# Patient Record
Sex: FEMALE | Race: WHITE | NOT HISPANIC OR LATINO | Employment: UNEMPLOYED | URBAN - NONMETROPOLITAN AREA
[De-identification: names, ages, dates, MRNs, and addresses within clinical notes are randomized per-mention and may not be internally consistent; named-entity substitution may affect disease eponyms.]

---

## 2019-01-21 ENCOUNTER — OFFICE VISIT (OUTPATIENT)
Dept: PULMONOLOGY | Facility: CLINIC | Age: 54
End: 2019-01-21

## 2019-01-21 ENCOUNTER — LAB (OUTPATIENT)
Dept: LAB | Facility: HOSPITAL | Age: 54
End: 2019-01-21

## 2019-01-21 ENCOUNTER — HOSPITAL ENCOUNTER (OUTPATIENT)
Dept: GENERAL RADIOLOGY | Facility: HOSPITAL | Age: 54
Discharge: HOME | End: 2019-01-21
Admitting: NURSE PRACTITIONER

## 2019-01-21 ENCOUNTER — DOCUMENTATION (OUTPATIENT)
Dept: PULMONOLOGY | Facility: CLINIC | Age: 54
End: 2019-01-21

## 2019-01-21 VITALS
OXYGEN SATURATION: 93 % | WEIGHT: 216.2 LBS | DIASTOLIC BLOOD PRESSURE: 80 MMHG | TEMPERATURE: 98 F | SYSTOLIC BLOOD PRESSURE: 130 MMHG | HEART RATE: 109 BPM | HEIGHT: 66 IN | BODY MASS INDEX: 34.75 KG/M2

## 2019-01-21 DIAGNOSIS — F17.211 CIGARETTE NICOTINE DEPENDENCE IN REMISSION: ICD-10-CM

## 2019-01-21 DIAGNOSIS — R09.02 HYPOXIA: ICD-10-CM

## 2019-01-21 DIAGNOSIS — I50.9 CONGESTIVE HEART FAILURE, UNSPECIFIED HF CHRONICITY, UNSPECIFIED HEART FAILURE TYPE (HCC): ICD-10-CM

## 2019-01-21 DIAGNOSIS — R53.83 OTHER FATIGUE: ICD-10-CM

## 2019-01-21 DIAGNOSIS — G47.33 OSA (OBSTRUCTIVE SLEEP APNEA): ICD-10-CM

## 2019-01-21 DIAGNOSIS — J43.2 CENTRILOBULAR EMPHYSEMA (HCC): ICD-10-CM

## 2019-01-21 DIAGNOSIS — R06.02 SHORTNESS OF BREATH: Primary | ICD-10-CM

## 2019-01-21 DIAGNOSIS — R06.02 SHORTNESS OF BREATH: ICD-10-CM

## 2019-01-21 LAB
ANION GAP SERPL CALCULATED.3IONS-SCNC: 9.6 MMOL/L (ref 3.6–11.2)
BASOPHILS # BLD AUTO: 0.02 10*3/MM3 (ref 0–0.3)
BASOPHILS NFR BLD AUTO: 0.2 % (ref 0–2)
BUN BLD-MCNC: 13 MG/DL (ref 7–21)
BUN/CREAT SERPL: 13.3 (ref 7–25)
CALCIUM SPEC-SCNC: 10 MG/DL (ref 7.7–10)
CHLORIDE SERPL-SCNC: 102 MMOL/L (ref 99–112)
CO2 SERPL-SCNC: 24.4 MMOL/L (ref 24.3–31.9)
CREAT BLD-MCNC: 0.98 MG/DL (ref 0.43–1.29)
DEPRECATED RDW RBC AUTO: 43.6 FL (ref 37–54)
EOSINOPHIL # BLD AUTO: 0.27 10*3/MM3 (ref 0–0.7)
EOSINOPHIL NFR BLD AUTO: 2.4 % (ref 0–5)
ERYTHROCYTE [DISTWIDTH] IN BLOOD BY AUTOMATED COUNT: 14.1 % (ref 11.5–14.5)
GFR SERPL CREATININE-BSD FRML MDRD: 59 ML/MIN/1.73
GLUCOSE BLD-MCNC: 309 MG/DL (ref 70–110)
HCT VFR BLD AUTO: 42.3 % (ref 37–47)
HGB BLD-MCNC: 14 G/DL (ref 12–16)
IMM GRANULOCYTES # BLD AUTO: 0.03 10*3/MM3 (ref 0–0.03)
IMM GRANULOCYTES NFR BLD AUTO: 0.3 % (ref 0–0.5)
LYMPHOCYTES # BLD AUTO: 4.86 10*3/MM3 (ref 1–3)
LYMPHOCYTES NFR BLD AUTO: 43.2 % (ref 21–51)
MCH RBC QN AUTO: 28.5 PG (ref 27–33)
MCHC RBC AUTO-ENTMCNC: 33.1 G/DL (ref 33–37)
MCV RBC AUTO: 86 FL (ref 80–94)
MONOCYTES # BLD AUTO: 0.5 10*3/MM3 (ref 0.1–0.9)
MONOCYTES NFR BLD AUTO: 4.4 % (ref 0–10)
NEUTROPHILS # BLD AUTO: 5.56 10*3/MM3 (ref 1.4–6.5)
NEUTROPHILS NFR BLD AUTO: 49.5 % (ref 30–70)
OSMOLALITY SERPL CALC.SUM OF ELEC: 283.8 MOSM/KG (ref 273–305)
PLATELET # BLD AUTO: 303 10*3/MM3 (ref 130–400)
PMV BLD AUTO: 11.6 FL (ref 6–10)
POTASSIUM BLD-SCNC: 4.2 MMOL/L (ref 3.5–5.3)
RBC # BLD AUTO: 4.92 10*6/MM3 (ref 4.2–5.4)
SODIUM BLD-SCNC: 136 MMOL/L (ref 135–153)
T3FREE SERPL-MCNC: 2.5 PG/ML (ref 2.3–4.2)
T4 FREE SERPL-MCNC: 1.15 NG/DL (ref 0.89–1.76)
TSH SERPL DL<=0.05 MIU/L-ACNC: 1.25 MIU/ML (ref 0.55–4.78)
WBC NRBC COR # BLD: 11.24 10*3/MM3 (ref 4.5–12.5)

## 2019-01-21 PROCEDURE — 80048 BASIC METABOLIC PNL TOTAL CA: CPT

## 2019-01-21 PROCEDURE — 99205 OFFICE O/P NEW HI 60 MIN: CPT | Performed by: NURSE PRACTITIONER

## 2019-01-21 PROCEDURE — 84439 ASSAY OF FREE THYROXINE: CPT

## 2019-01-21 PROCEDURE — 94618 PULMONARY STRESS TESTING: CPT | Performed by: NURSE PRACTITIONER

## 2019-01-21 PROCEDURE — 85025 COMPLETE CBC W/AUTO DIFF WBC: CPT

## 2019-01-21 PROCEDURE — 36415 COLL VENOUS BLD VENIPUNCTURE: CPT

## 2019-01-21 PROCEDURE — 71046 X-RAY EXAM CHEST 2 VIEWS: CPT

## 2019-01-21 PROCEDURE — 84481 FREE ASSAY (FT-3): CPT

## 2019-01-21 PROCEDURE — 71046 X-RAY EXAM CHEST 2 VIEWS: CPT | Performed by: RADIOLOGY

## 2019-01-21 PROCEDURE — 94664 DEMO&/EVAL PT USE INHALER: CPT | Performed by: NURSE PRACTITIONER

## 2019-01-21 PROCEDURE — 84443 ASSAY THYROID STIM HORMONE: CPT

## 2019-01-21 RX ORDER — DEXTROMETHORPHAN HBR, GUAIFENESIN 20; 200 MG/10ML; MG/10ML
SYRUP ORAL
COMMUNITY
Start: 2018-11-13 | End: 2019-03-07

## 2019-01-21 RX ORDER — ASPIRIN 325 MG
81 TABLET ORAL DAILY
COMMUNITY
Start: 2019-01-04

## 2019-01-21 RX ORDER — OFLOXACIN 3 MG/ML
SOLUTION/ DROPS OPHTHALMIC
COMMUNITY
Start: 2016-01-07 | End: 2019-03-07

## 2019-01-21 RX ORDER — OXYBUTYNIN CHLORIDE 5 MG/1
TABLET ORAL
COMMUNITY
Start: 2018-11-13 | End: 2019-03-27

## 2019-01-21 RX ORDER — MONTELUKAST SODIUM 10 MG/1
10 TABLET ORAL NIGHTLY
COMMUNITY
Start: 2019-01-04

## 2019-01-21 RX ORDER — LORATADINE 10 MG/1
TABLET ORAL
COMMUNITY
Start: 2019-01-04 | End: 2019-03-27

## 2019-01-21 RX ORDER — LOVASTATIN 10 MG/1
10 TABLET ORAL EVERY EVENING
COMMUNITY
Start: 2016-01-07 | End: 2019-06-03 | Stop reason: ALTCHOICE

## 2019-01-21 RX ORDER — FLUOXETINE HYDROCHLORIDE 40 MG/1
40 CAPSULE ORAL NIGHTLY
COMMUNITY
Start: 2019-01-04

## 2019-01-21 RX ORDER — BUDESONIDE AND FORMOTEROL FUMARATE DIHYDRATE 160; 4.5 UG/1; UG/1
2 AEROSOL RESPIRATORY (INHALATION) 2 TIMES DAILY
Qty: 1 INHALER | Refills: 11 | Status: SHIPPED | OUTPATIENT
Start: 2019-01-21 | End: 2019-03-27

## 2019-01-21 RX ORDER — LOVASTATIN 10 MG/1
TABLET ORAL
COMMUNITY
Start: 2019-01-04 | End: 2019-03-07 | Stop reason: SDUPTHER

## 2019-01-21 RX ORDER — AZITHROMYCIN 250 MG/1
TABLET, FILM COATED ORAL
COMMUNITY
Start: 2018-11-13 | End: 2019-03-07

## 2019-01-21 RX ORDER — ALBUTEROL SULFATE 90 UG/1
AEROSOL, METERED RESPIRATORY (INHALATION)
COMMUNITY
Start: 2018-11-13 | End: 2019-03-27

## 2019-01-21 RX ORDER — METFORMIN HYDROCHLORIDE 500 MG/1
1000 TABLET, EXTENDED RELEASE ORAL 2 TIMES DAILY
COMMUNITY
Start: 2019-01-04

## 2019-01-21 RX ORDER — BUSPIRONE HYDROCHLORIDE 10 MG/1
10 TABLET ORAL 2 TIMES DAILY
COMMUNITY
Start: 2019-01-04

## 2019-01-21 NOTE — PROGRESS NOTES
Were you born premature?  unsure    Any Childhood infections? no      Breathing problems when you were a child? Asthma    Any childhood allergies?    yes             At what age did you begin smoking? Yes, started smoking about 8 years old    Smoking marijuana? no    Any IV drugs? no    How many packs per day? 2, quit 9 years ago    Lung Function Test? yes  Chest X-Ray? yes    CT Chest? no Allergy Test? no    Family hx of Lung disease or Lung Cancer?yes    If FHx is posivitive for lung cancer, what is the relationship of the family member? father and mother    Any hospitalization in the last year? Yes, breathing problems    How far can you walk without getting short of breath? 50 feet    Any coughing? yes    Any wheezing? yes    Acid Reflux? yes    Do you snore? yes    Daytime Fatigue? yes    Any pets? yes   Any pet allergies? 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 no    What inhalers are you currently using? xopenex as needed, advair but is not using    Have you had the Influenza Vaccine? yes    Would you like to receive this Vaccine today? no    Have you had the Pneumonia Vaccine?  yes   Would you like to receive this Vaccine today? no      Subjective    Vivian Collette presents for the following Sleep Apnea       History of Present Illness   Ms. Collette is here today for evaluation of shortness of breath and sleep apnea.  She states that she feels constantly fatigued during the day and states that she falls asleep all the time.  She states that she experiences fragmented sleep in which she wakes up smothering.  She also reports she has smothering and shortness of breath all the time that is worse with exertion.  She states that she does snore and have morning headache.  She also provides a positive history of congestive heart failure.  She states that she is currently taking an albuterol inhaler as needed and is supposed to be using Advair but does not think that it  helps her much.  She states that as a child she did have asthma.  She is a former smoker quitting about 9 years ago.  She states that when she smoked she smoked about 2 packs per day.  Her influenza and pneumonia vaccines are up-to-date.     Review of Systems   Constitutional: Positive for activity change (worsening fatigue) and fatigue.   HENT: Negative for congestion and rhinorrhea.         Loud snoring   Respiratory: Positive for apnea, cough, shortness of breath and wheezing.    Cardiovascular: Negative for chest pain.   Gastrointestinal: Negative for abdominal pain and nausea.   Endocrine: Negative for polydipsia, polyphagia and polyuria.   Skin: Negative for pallor and rash.        Becomes diaphoretic when walking   Allergic/Immunologic: Negative for environmental allergies, food allergies and immunocompromised state.   Neurological: Positive for dizziness, light-headedness and headaches.        Worse when walking   Psychiatric/Behavioral: Positive for sleep disturbance.       Active Problems:  Problem List Items Addressed This Visit        Respiratory    Shortness of breath - Primary    Relevant Orders    Full Pulmonary Function Test With Bronchodilator    Adult Transthoracic Echo Complete W/ Cont if Necessary Per Protocol    XR Chest 2 View (Completed)    CT Chest Without Contrast    ESTEVAN (obstructive sleep apnea)    Relevant Orders    Ambulatory Referral to Sleep Lab    Centrilobular emphysema (CMS/HCC)    Relevant Medications    VENTOLIN  (90 Base) MCG/ACT inhaler    ROBAFEN DM [facility]/CHEST CONGEST  MG/5ML liquid    loratadine (CLARITIN) 10 MG tablet    montelukast (SINGULAIR) 10 MG tablet    budesonide-formoterol (SYMBICORT) 160-4.5 MCG/ACT inhaler    Other Relevant Orders    Full Pulmonary Function Test With Bronchodilator    CT Chest Without Contrast       Other    Other fatigue    Relevant Orders    TSH (Completed)    T3, Free (Completed)    T4, Free (Completed)    CBC & Differential  (Completed)    Basic Metabolic Panel (Completed)      Other Visit Diagnoses     Cigarette nicotine dependence in remission        Relevant Orders    Full Pulmonary Function Test With Bronchodilator    Hypoxia        Relevant Orders    Overnight Sleep Oximetry Study    Miscellaneous DME    Congestive heart failure, unspecified HF chronicity, unspecified heart failure type (CMS/HCC)        Relevant Orders    Ambulatory Referral to Cardiology          Past Medical History:  Past Medical History:   Diagnosis Date   • Diabetes mellitus (CMS/HCC)    • Hypertension        Family History:  Family History   Problem Relation Age of Onset   • Cancer Mother    • Hypertension Mother    • Asthma Mother    • Diabetes Mother    • Asthma Father    • Cancer Father    • Diabetes Father    • Hypertension Father    • Cancer Maternal Aunt    • Asthma Maternal Aunt    • Diabetes Maternal Aunt    • Hypertension Maternal Aunt    • Asthma Maternal Uncle    • Cancer Maternal Uncle    • Diabetes Maternal Uncle    • Hypertension Maternal Uncle    • Asthma Maternal Grandmother    • Cancer Maternal Grandmother    • Diabetes Maternal Grandmother    • Hypertension Maternal Grandmother    • Asthma Maternal Grandfather    • Cancer Maternal Grandfather    • Diabetes Maternal Grandfather    • Hypertension Maternal Grandfather        Social History:  Social History     Tobacco Use   • Smoking status: Former Smoker   • Smokeless tobacco: Never Used   Substance Use Topics   • Alcohol use: No     Frequency: Never       Current Medications:  Current Outpatient Medications   Medication Sig Dispense Refill   • lovastatin (MEVACOR) 10 MG tablet Take  by mouth.     • ofloxacin (OCUFLOX) 0.3 % ophthalmic solution Apply  to eye(s) as directed by provider.     • azithromycin (ZITHROMAX) 250 MG tablet      • CHARY LOW DOSE 81 MG EC tablet      • budesonide-formoterol (SYMBICORT) 160-4.5 MCG/ACT inhaler Inhale 2 puffs 2 (Two) Times a Day. 1 inhaler 11   • busPIRone  "(BUSPAR) 10 MG tablet      • diclofenac (VOLTAREN) 50 MG EC tablet      • FLUoxetine (PROzac) 40 MG capsule      • loratadine (CLARITIN) 10 MG tablet      • lovastatin (MEVACOR) 10 MG tablet      • metFORMIN ER (GLUCOPHAGE-XR) 500 MG 24 hr tablet      • montelukast (SINGULAIR) 10 MG tablet      • oxybutynin (DITROPAN) 5 MG tablet      • ROBAFEN DM CGH/CHEST CONGEST  MG/5ML liquid      • VENTOLIN  (90 Base) MCG/ACT inhaler        No current facility-administered medications for this visit.        Allergies:  No Known Allergies    Vitals:  /80   Pulse 109   Temp 98 °F (36.7 °C)   Ht 167.6 cm (66\")   Wt 98.1 kg (216 lb 3.2 oz)   SpO2 93%   BMI 34.90 kg/m²     Imaging:    Imaging Results (most recent)     None          Pulmonary Functions Testing Results:    No results found for: FEV1, FVC, [id number], TLC, DLCO    Results for orders placed or performed during the hospital encounter of 02/05/16   Pregnancy, urine   Result Value Ref Range    HCG Urine, QL Negative Negative   POCT glucose fingerstick   Result Value Ref Range    Glucose 137 (H) 70 - 110 mg/dL       Objective   Physical Exam     GENERAL APPEARANCE: Well developed, well nourished, alert and cooperative, and appears to be in no acute distress.    HEAD: normocephalic.    EYES: PERRL, EOMI. Fundi normal, vision is grossly intact.    THROAT: Oral cavity and pharynx normal. No inflammation, swelling, exudate, or lesions.     NECK: Neck supple.     CARDIAC: Normal S1 and S2. No S3, S4 or murmurs. Rhythm is regular. There is no peripheral edema, cyanosis or pallor. Extremities are warm and well perfused. Capillary refill is less than 2 seconds. No carotid bruits.    RESPIRATORY: Bilateral air entry positive.  Diminished breath sounds bilaterally.  No wheezing, crackles or rhonchi noted.    GI: Positive bowel sounds. Soft, nondistended, nontender.     MUSCULOSKELETAL: No significant deformity or joint abnormality. No edema. Peripheral pulses " intact. No varicosities.    NEUROLOGICAL: Strength and sensation symmetric and intact throughout.     PSYCHIATRIC: The mental examination revealed the patient was oriented to person, place, and time.       Assessment/Plan       COPD/asthma:  Social History     Tobacco Use   Smoking Status Former Smoker   Smokeless Tobacco Never Used     Plan:  -Continue albuterol inhaler as needed  - Started her on Symbicort 2 puffs twice daily.  - Ordered an echo.  -Ordered chest x-ray.  -Ordered CT scan to evaluate lung parenchyma  -Ordered PFT to assess lung function.  -Ordered CBC, TSH, free T4, free T3 and BMP  - Ordered pulse oximetry while ambulating to assess supplemental oxygen need during exertion.  Initial oxygen saturation was 93% on room air and her heart rate was 103.  She had to quit the test after 4 minutes due to dizziness and lightheadedness.  At this time her oxygen saturation had dropped to 82% on room air with a heart rate of 62.  She improved after 2 minutes of rest to oxygen saturation of 96% and heart rate of 102.  We'll place an order for her to have supplemental oxygen at 2 L when walking and as needed.  - Flu and pneumonia vaccination status: Up to date  -We'll also place a cardiology consultation as patient has a history of CHF and has not followed up with cardiology in a while.  - Ordered nocturnal oximetry      - Inhaler technique demonstration/discussion:  I have extensively discussed the steps.  In summary, the steps were discussed in the following order.Patient was advised to rinse the mouth after steroid inhalation to prevent fungal mucositis.  · Remove the cap from the inhaler and shake well.    · Breathe out all the way.    · Place the mouthpiece of the inhaler between your teeth and seal your lips tightly around it.    · As you start to blow in slowly, press down on the canister one time.   · Keep breathing in as slowly and deeply as you can.    · It should take about 5 seconds for you to  completely breathe in.    · Hold your breath for 10 seconds(count to 10 slowly) to allow the medication to reach the airways of the lung.    · Repeat the above steps for each puff.    · Wait about 1 minute between the puffs.    · Replaced the cap on the inhaler when finished.    - Supplemental oxygen use counseling  I have thoroughly educated the patient on the importance of getting oxygen as prescribed.  Patient was educated on the guidelines that the use of supplemental oxygen more than 15 hours per day is associated with increase survival, reduce COPD complications, improve quality of life and  increase exercise tolerance.  I have extensively discussed the risk of not using supplemental oxygen with the patient which includes worsening of shortness of breath, becoming hypoxic, dizziness, heart attack, irregular heartbeat, fall resulting into brain hemorrhage and death.I had a detailed discussion with the patient over the safety of using oxygen at home.  Following points were discussed in detail..  Oxygen is a safe, non-explosive gas when handled correctly. However, any material that is already burning will burn much faster and hotter in an oxygen-enriched environment, so the following precautions should be observed when you use home oxygen.  · Keep the oxygen at least 3 metres from any open flame or heat source, such as candles or a gas stove, or from anything that could cause a spark.  · Do not smoke or let anyone else smoke near the oxygen equipment.  · Avoid using anything flammable near the oxygen, including petrol, cleaning fluid, and aerosol cans or sprays such as fresheners or hairspray.  · Do not allow alcoholic solutions, oil or grease to come into contact with oxygen supply devices. This includes petroleum jelly.  · Check that all electrical equipment in the vicinity of the oxygen is properly grounded (earthed).  · Avoid using electrical appliances such as hairdryers and razors while oxygen is in  use.  · Make sure you have smoke alarms in your house.  · Keep the oxygen equipment clean and dust free.  · Always plug your oxygen concentrator into a grounded electrical outlet. Never use an extension cord or power board.  · As the oxygen concentrator becomes hot when in use, locate it in a well-ventilated area, away from curtains or drapes.  · Have your oxygen concentrator inspected and serviced regularly according to the supplier's instructions.  · Store oxygen cylinders in an upright secure position in a well-ventilated area away from any open flame, heat source or direct sunlight. Do not cover with cloth or plastic.  · Handle oxygen equipment with care to avoid damaging cylinders.  · Secure and transport oxygen cylinders correctly. Check with your state or territory department of transport regarding the transport of oxygen in cars as safety standards may vary from state to state.  · Use the correct pressure gauge and regulator.  · When a cylinder is almost empty, close the valve and lizandro the cylinder as empty. Do not store full and empty cylinders together.           Obstructive sleep apnea  -Thyroid function panel ordered  - Polysomnography ordered.  - Educated to avoid alcohol, sedatives and narcotics   - Patient's Body mass index is 34.9 kg/m². BMI is above normal parameters. Recommendations include: exercise counseling and nutrition counseling.  - Based on polysomnography results, positive airway pressure treatment will be initiated.  - Patient was educated on positive airway pressure treatment.  As per CMS guidelines, more than 4 hours on 70% of observed nights is considered adherence. Patient was strongly encouraged to use CPAP as much as possible during sleep as more CPAP use is equal to more benefit. Use of heated humidification in positive airway pressure treatment to improve the adherence to the device.  In case of claustrophobia, we will provide the patient cognitive behavioral therapy and  desensitization. Oral appliances use will be discussed with the patient in case of mild to moderate sleep apnea or if the patient with severe disease fail positive airway pressure treatment.       The patient was extensively educated on the consequences of untreated obstructive sleep apnea namely cardiovascular/metabolic disorder, neurocognitive deficit, daytime sleepiness, motor vehicle accidents, depression, mood disorders and reduced quality of life.  At the end of conversation, the patient voices understanding of the disease process and treatment modality.  Patient also understands the risk of untreated obstructive sleep apnea and benefit benefits of the treatment.    Counseling time was greater than 10 minutes.          ICD-10-CM ICD-9-CM   1. Shortness of breath R06.02 786.05   2. Other fatigue R53.83 780.79   3. ESTEVAN (obstructive sleep apnea) G47.33 327.23   4. Centrilobular emphysema (CMS/[facility]) J43.2 492.8   5. Cigarette nicotine dependence in remission F17.211 V15.82   6. Hypoxia R09.02 799.02   7. Congestive heart failure, unspecified HF chronicity, unspecified heart failure type (CMS/[facility]) I50.9 428.0       Return in about 2 months (around 3/21/2019).

## 2019-01-22 ENCOUNTER — TELEPHONE (OUTPATIENT)
Dept: PULMONOLOGY | Facility: CLINIC | Age: 54
End: 2019-01-22

## 2019-01-22 NOTE — TELEPHONE ENCOUNTER
----- Message from NAEEM Stahl sent at 1/21/2019  4:12 PM EST -----  Will you let her know that her chest xray and lab work looked good.  I will let her know about the rest of her results as I get them.    ----- Message -----  From: Interface, Rad Results [place] [facility]  Sent: 1/21/2019   3:39 PM  To: NAEEM Stahl

## 2019-01-24 ENCOUNTER — DOCUMENTATION (OUTPATIENT)
Dept: PULMONOLOGY | Facility: CLINIC | Age: 54
End: 2019-01-24

## 2019-02-07 ENCOUNTER — HOSPITAL ENCOUNTER (OUTPATIENT)
Dept: RESPIRATORY THERAPY | Facility: HOSPITAL | Age: 54
Discharge: HOME | End: 2019-02-07
Admitting: NURSE PRACTITIONER

## 2019-02-07 ENCOUNTER — HOSPITAL ENCOUNTER (OUTPATIENT)
Dept: CARDIOLOGY | Facility: HOSPITAL | Age: 54
Discharge: HOME | End: 2019-02-07

## 2019-02-07 DIAGNOSIS — R06.02 SHORTNESS OF BREATH: ICD-10-CM

## 2019-02-07 DIAGNOSIS — F17.211 CIGARETTE NICOTINE DEPENDENCE IN REMISSION: ICD-10-CM

## 2019-02-07 DIAGNOSIS — J43.2 CENTRILOBULAR EMPHYSEMA (HCC): ICD-10-CM

## 2019-02-07 PROCEDURE — 94640 AIRWAY INHALATION TREATMENT: CPT

## 2019-02-07 PROCEDURE — 94729 DIFFUSING CAPACITY: CPT

## 2019-02-07 PROCEDURE — 94729 DIFFUSING CAPACITY: CPT | Performed by: INTERNAL MEDICINE

## 2019-02-07 PROCEDURE — 94060 EVALUATION OF WHEEZING: CPT

## 2019-02-07 PROCEDURE — 93306 TTE W/DOPPLER COMPLETE: CPT

## 2019-02-07 PROCEDURE — 94060 EVALUATION OF WHEEZING: CPT | Performed by: INTERNAL MEDICINE

## 2019-02-07 PROCEDURE — 93306 TTE W/DOPPLER COMPLETE: CPT | Performed by: INTERNAL MEDICINE

## 2019-02-07 RX ORDER — ALBUTEROL SULFATE 2.5 MG/3ML
2.5 SOLUTION RESPIRATORY (INHALATION) ONCE
Status: COMPLETED | OUTPATIENT
Start: 2019-02-07 | End: 2019-02-07

## 2019-02-07 RX ADMIN — ALBUTEROL SULFATE 2.5 MG: 2.5 SOLUTION RESPIRATORY (INHALATION) at 08:56

## 2019-02-08 LAB
BH CV ECHO MEAS - % IVS THICK: 9.2 %
BH CV ECHO MEAS - % LVPW THICK: 54.9 %
BH CV ECHO MEAS - ACS: 2 CM
BH CV ECHO MEAS - AO MAX PG: 7.7 MMHG
BH CV ECHO MEAS - AO MEAN PG: 3.3 MMHG
BH CV ECHO MEAS - AO ROOT AREA (BSA CORRECTED): 1.5
BH CV ECHO MEAS - AO ROOT AREA: 7.2 CM^2
BH CV ECHO MEAS - AO ROOT DIAM: 3 CM
BH CV ECHO MEAS - AO V2 MAX: 138.5 CM/SEC
BH CV ECHO MEAS - AO V2 MEAN: 81.3 CM/SEC
BH CV ECHO MEAS - AO V2 VTI: 18.9 CM
BH CV ECHO MEAS - BSA(HAYCOCK): 2.2 M^2
BH CV ECHO MEAS - BSA: 2.1 M^2
BH CV ECHO MEAS - BZI_BMI: 34.9 KILOGRAMS/M^2
BH CV ECHO MEAS - BZI_METRIC_HEIGHT: 167.6 CM
BH CV ECHO MEAS - BZI_METRIC_WEIGHT: 98 KG
BH CV ECHO MEAS - EDV(CUBED): 63.3 ML
BH CV ECHO MEAS - EDV(MOD-SP4): 44 ML
BH CV ECHO MEAS - EDV(TEICH): 69.4 ML
BH CV ECHO MEAS - EF(CUBED): 51.7 %
BH CV ECHO MEAS - EF(MOD-SP4): 68.2 %
BH CV ECHO MEAS - EF(TEICH): 44.2 %
BH CV ECHO MEAS - ESV(CUBED): 30.5 ML
BH CV ECHO MEAS - ESV(MOD-SP4): 14 ML
BH CV ECHO MEAS - ESV(TEICH): 38.7 ML
BH CV ECHO MEAS - FS: 21.6 %
BH CV ECHO MEAS - IVS/LVPW: 1.1
BH CV ECHO MEAS - IVSD: 1 CM
BH CV ECHO MEAS - IVSS: 1.1 CM
BH CV ECHO MEAS - LA DIMENSION: 2.7 CM
BH CV ECHO MEAS - LA/AO: 0.89
BH CV ECHO MEAS - LV DIASTOLIC VOL/BSA (35-75): 21.3 ML/M^2
BH CV ECHO MEAS - LV MASS(C)D: 120.5 GRAMS
BH CV ECHO MEAS - LV MASS(C)DI: 58.3 GRAMS/M^2
BH CV ECHO MEAS - LV MASS(C)S: 126.6 GRAMS
BH CV ECHO MEAS - LV MASS(C)SI: 61.3 GRAMS/M^2
BH CV ECHO MEAS - LV SYSTOLIC VOL/BSA (12-30): 6.8 ML/M^2
BH CV ECHO MEAS - LVIDD: 4 CM
BH CV ECHO MEAS - LVIDS: 3.1 CM
BH CV ECHO MEAS - LVLD AP4: 6.9 CM
BH CV ECHO MEAS - LVLS AP4: 5.8 CM
BH CV ECHO MEAS - LVOT AREA (M): 3.1 CM^2
BH CV ECHO MEAS - LVOT AREA: 3.1 CM^2
BH CV ECHO MEAS - LVOT DIAM: 2 CM
BH CV ECHO MEAS - LVPWD: 0.94 CM
BH CV ECHO MEAS - LVPWS: 1.5 CM
BH CV ECHO MEAS - MV A MAX VEL: 86.4 CM/SEC
BH CV ECHO MEAS - MV E MAX VEL: 60.2 CM/SEC
BH CV ECHO MEAS - MV E/A: 0.7
BH CV ECHO MEAS - PA ACC SLOPE: 3778 CM/SEC^2
BH CV ECHO MEAS - PA ACC TIME: 0.04 SEC
BH CV ECHO MEAS - PA PR(ACCEL): 63.2 MMHG
BH CV ECHO MEAS - SI(AO): 66.1 ML/M^2
BH CV ECHO MEAS - SI(CUBED): 15.9 ML/M^2
BH CV ECHO MEAS - SI(MOD-SP4): 14.5 ML/M^2
BH CV ECHO MEAS - SI(TEICH): 14.9 ML/M^2
BH CV ECHO MEAS - SV(AO): 136.6 ML
BH CV ECHO MEAS - SV(CUBED): 32.8 ML
BH CV ECHO MEAS - SV(MOD-SP4): 30 ML
BH CV ECHO MEAS - SV(TEICH): 30.7 ML
MAXIMAL PREDICTED HEART RATE: 166 BPM
STRESS TARGET HR: 141 BPM

## 2019-02-25 ENCOUNTER — DOCUMENTATION (OUTPATIENT)
Dept: PULMONOLOGY | Facility: CLINIC | Age: 54
End: 2019-02-25

## 2019-02-26 ENCOUNTER — TELEPHONE (OUTPATIENT)
Dept: PULMONOLOGY | Facility: CLINIC | Age: 54
End: 2019-02-26

## 2019-02-26 NOTE — TELEPHONE ENCOUNTER
Patient informed  ----- Message from NAEEM Stahl sent at 2/25/2019  3:05 PM EST -----  Please let her know that her PFT showed no major obstruction but we will continue her current inhalers.  ----- Message -----  From: Lyle Young MD  Sent: 2/18/2019  10:12 PM  To: NAEEM Stahl

## 2019-03-07 ENCOUNTER — LAB (OUTPATIENT)
Dept: LAB | Facility: HOSPITAL | Age: 54
End: 2019-03-07

## 2019-03-07 ENCOUNTER — OFFICE VISIT (OUTPATIENT)
Dept: CARDIOLOGY | Facility: CLINIC | Age: 54
End: 2019-03-07

## 2019-03-07 VITALS
BODY MASS INDEX: 37.39 KG/M2 | HEIGHT: 64 IN | OXYGEN SATURATION: 93 % | RESPIRATION RATE: 16 BRPM | DIASTOLIC BLOOD PRESSURE: 86 MMHG | SYSTOLIC BLOOD PRESSURE: 125 MMHG | WEIGHT: 219 LBS | HEART RATE: 92 BPM

## 2019-03-07 DIAGNOSIS — I10 ESSENTIAL HYPERTENSION: ICD-10-CM

## 2019-03-07 DIAGNOSIS — R07.2 PRECORDIAL PAIN: Primary | ICD-10-CM

## 2019-03-07 DIAGNOSIS — E66.9 CLASS 2 OBESITY WITHOUT SERIOUS COMORBIDITY WITH BODY MASS INDEX (BMI) OF 37.0 TO 37.9 IN ADULT, UNSPECIFIED OBESITY TYPE: ICD-10-CM

## 2019-03-07 DIAGNOSIS — I31.39 PERICARDIAL EFFUSION: ICD-10-CM

## 2019-03-07 DIAGNOSIS — E78.5 DYSLIPIDEMIA: ICD-10-CM

## 2019-03-07 DIAGNOSIS — E11.9 TYPE 2 DIABETES MELLITUS WITHOUT COMPLICATION, WITHOUT LONG-TERM CURRENT USE OF INSULIN (HCC): ICD-10-CM

## 2019-03-07 PROBLEM — E66.812 CLASS 2 OBESITY IN ADULT: Status: ACTIVE | Noted: 2019-03-07

## 2019-03-07 LAB — CRP SERPL-MCNC: 2.15 MG/DL (ref 0–0.99)

## 2019-03-07 PROCEDURE — 93000 ELECTROCARDIOGRAM COMPLETE: CPT | Performed by: INTERNAL MEDICINE

## 2019-03-07 PROCEDURE — 86038 ANTINUCLEAR ANTIBODIES: CPT

## 2019-03-07 PROCEDURE — 36415 COLL VENOUS BLD VENIPUNCTURE: CPT

## 2019-03-07 PROCEDURE — 86140 C-REACTIVE PROTEIN: CPT

## 2019-03-07 PROCEDURE — 99204 OFFICE O/P NEW MOD 45 MIN: CPT | Performed by: INTERNAL MEDICINE

## 2019-03-07 RX ORDER — GLYBURIDE 5 MG/1
5 TABLET ORAL DAILY PRN
COMMUNITY

## 2019-03-07 RX ORDER — FLUTICASONE PROPIONATE 50 MCG
2 SPRAY, SUSPENSION (ML) NASAL DAILY
COMMUNITY
End: 2019-03-27

## 2019-03-07 NOTE — PROGRESS NOTES
Pallavi Jones APRN  Vivian Collette  1965  03/07/2019    Patient Active Problem List   Diagnosis   • Shortness of breath   • Other fatigue   • ESTEVAN (obstructive sleep apnea)   • Centrilobular emphysema (CMS/HCC)   • Precordial pain   • Essential hypertension   • Type 2 diabetes mellitus without complication, without long-term current use of insulin (CMS/HCC)   • Dyslipidemia   • Class 2 obesity in adult   • Pericardial effusion (small circumferential with no tamponade) noted on recent echo Doppler study on 1/29/2019.       Dear Pallavi Jones, NAEEM:    Subjective     Vivian Collette is a 53 y.o. female with the problems as listed above, presents    Chief complaint: Recurrent chest pains and shortness of breath and previous history of congestive heart failure and a small pericardial effusion.    History of Present Illness: Ms. Renee is a pleasant 53-year-old  female with history of reportedly having had congestive heart failure in the remote past, has history of COPD/asthma for which she follows with pulmonary services, has been referred here for her history of congestive heart failure and recurrent chest pains.  On further questioning she gives history of having recurrent substernal and left-sided chest pains that she has been having on and off for the last year or so.  These are of moderate intensity.  These chest pains are felt as sharp to dull pains that seem to occur at random with no relation to exertion and resolve spontaneously.  There is some associated shortness of breath but no sweating.  These are mostly localized.  She has multiple risk factors for coronary artery disease including type 2 diabetes mellitus, hypertension, dyslipidemia, positive family history and postmenopausal status.  She has past history of smoking 2 packs a day for about 32 years but quit 7 years ago.    On recent echo Doppler study on 1/29/2019, she was noted to have small circumferential pericardial  effusion with no echocardiographic evidence of cardiac tamponade.  Her LV wall motion and systolic function was normal with no significant valvular abnormalities noted.    Cardiac risk factors:diabetes mellitus, hypertension, smoking, Positive family Hx. of premature athersclerotivc disease., Obesity and Age and postmenopausal status.    No Known Allergies:      Current Outpatient Medications:   •  CHARY LOW DOSE 81 MG EC tablet, , Disp: , Rfl:   •  budesonide-formoterol (SYMBICORT) 160-4.5 MCG/ACT inhaler, Inhale 2 puffs 2 (Two) Times a Day., Disp: 1 inhaler, Rfl: 11  •  busPIRone (BUSPAR) 10 MG tablet, 10 mg 2 (Two) Times a Day., Disp: , Rfl:   •  diclofenac (VOLTAREN) 50 MG EC tablet, , Disp: , Rfl:   •  FLUoxetine (PROzac) 40 MG capsule, , Disp: , Rfl:   •  fluticasone (FLONASE) 50 MCG/ACT nasal spray, 2 sprays into the nostril(s) as directed by provider Daily., Disp: , Rfl:   •  glyBURIDE (DIAbeta) 5 MG tablet, Take 5 mg by mouth Daily With Breakfast., Disp: , Rfl:   •  loratadine (CLARITIN) 10 MG tablet, , Disp: , Rfl:   •  lovastatin (MEVACOR) 10 MG tablet, Take  by mouth., Disp: , Rfl:   •  metFORMIN ER (GLUCOPHAGE-XR) 500 MG 24 hr tablet, 500 mg 2 (Two) Times a Day., Disp: , Rfl:   •  montelukast (SINGULAIR) 10 MG tablet, , Disp: , Rfl:   •  O2 (OXYGEN), Inhale 2 L/min Every Night., Disp: , Rfl:   •  oxybutynin (DITROPAN) 5 MG tablet, , Disp: , Rfl:   •  VENTOLIN  (90 Base) MCG/ACT inhaler, , Disp: , Rfl:   •  metoprolol tartrate (LOPRESSOR) 25 MG tablet, Take 1 tablet by mouth 2 (Two) Times a Day., Disp: 60 tablet, Rfl: 3    Past Medical History:   Diagnosis Date   • Asthma    • Diabetes mellitus (CMS/HCC)    • Hypertension      Past Surgical History:   Procedure Laterality Date   • EAR RECONSTRUCTION     • LAPAROSCOPIC TUBAL LIGATION     • TONSILLECTOMY       Family History   Problem Relation Age of Onset   • Cancer Mother    • Hypertension Mother    • Asthma Mother    • Diabetes Mother    • Asthma  "Father    • Cancer Father    • Diabetes Father    • Hypertension Father    • Cancer Maternal Aunt    • Asthma Maternal Aunt    • Diabetes Maternal Aunt    • Hypertension Maternal Aunt    • Asthma Maternal Uncle    • Cancer Maternal Uncle    • Diabetes Maternal Uncle    • Hypertension Maternal Uncle    • Asthma Maternal Grandmother    • Cancer Maternal Grandmother    • Diabetes Maternal Grandmother    • Hypertension Maternal Grandmother    • Asthma Maternal Grandfather    • Cancer Maternal Grandfather    • Diabetes Maternal Grandfather    • Hypertension Maternal Grandfather      Social History     Tobacco Use   • Smoking status: Former Smoker     Packs/day: 2.00     Types: Cigarettes     Last attempt to quit:      Years since quittin.1   • Smokeless tobacco: Never Used   Substance Use Topics   • Alcohol use: No     Frequency: Never   • Drug use: No       Review of Systems   Constitution: Positive for malaise/fatigue.   HENT: Positive for congestion, hearing loss and nosebleeds.         Sinus prob   Eyes: Positive for visual disturbance.   Cardiovascular: Positive for chest pain, leg swelling and palpitations.   Respiratory: Positive for cough, shortness of breath and wheezing.    Hematologic/Lymphatic: Bruises/bleeds easily.   Skin: Positive for color change, dry skin, nail changes and poor wound healing.   Musculoskeletal: Positive for back pain, joint pain, joint swelling, muscle cramps, muscle weakness and stiffness.   Gastrointestinal: Positive for diarrhea.   Genitourinary: Positive for frequency.   Neurological: Positive for dizziness, headaches, light-headedness, numbness and paresthesias.   Psychiatric/Behavioral: Positive for depression. The patient has insomnia and is nervous/anxious.        Objective   Blood pressure 125/86, pulse 92, resp. rate 16, height 162.6 cm (64\"), weight 99.3 kg (219 lb), SpO2 93 %.  Body mass index is 37.59 kg/m².        Physical Exam   Constitutional: She is oriented to " person, place, and time. She appears well-developed and well-nourished.   HENT:   Mouth/Throat: Oropharynx is clear and moist.   Eyes: EOM are normal. Pupils are equal, round, and reactive to light.   Neck: Neck supple. No JVD present. No tracheal deviation present. No thyromegaly present.   Cardiovascular: Normal rate, regular rhythm, S1 normal and S2 normal. Exam reveals no gallop, no S3, no S4 and no friction rub.   No murmur heard.  Pulses:       Dorsalis pedis pulses are 1+ on the right side, and 1+ on the left side.        Posterior tibial pulses are 2+ on the right side, and 2+ on the left side.   Pulmonary/Chest: Effort normal and breath sounds normal.   Abdominal: Soft. Bowel sounds are normal. She exhibits no mass. There is no tenderness.   Musculoskeletal: Normal range of motion. She exhibits no edema.   Lymphadenopathy:     She has no cervical adenopathy.   Neurological: She is alert and oriented to person, place, and time.   Skin: Skin is warm and dry. No rash noted.   Psychiatric: She has a normal mood and affect.       Lab Results   Component Value Date     01/21/2019    K 4.2 01/21/2019     01/21/2019    CO2 24.4 01/21/2019    BUN 13 01/21/2019    CREATININE 0.98 01/21/2019    GLUCOSE 309 (H) 01/21/2019    CALCIUM 10.0 01/21/2019     No results found for: CKTOTAL  Lab Results   Component Value Date    WBC 11.24 01/21/2019    HGB 14.0 01/21/2019    HCT 42.3 01/21/2019     01/21/2019     No results found for: INR  No results found for: MG  Lab Results   Component Value Date    TSH 1.250 01/21/2019      No results found for: BNP    During this visit the following were done:  Labs Reviewed [x]    Referring Provider Records Reviewed []          ECG 12 Lead  Date/Time: 3/7/2019 1:23 PM  Performed by: Yadiel Bruno MD  Authorized by: Yadiel Bruno MD   Rhythm: sinus rhythm  ST Segments: ST segments normal  T Waves: T waves normal  Other: no other findings    Clinical impression:  normal ECG            Assessment/Plan :   Diagnosis Plan   1. Precordial pain  Stress Test With Myocardial Perfusion.   2. Pericardial effusion (small circumferential with no tamponade) noted on recent echo Doppler study on 1/29/2019.  Antinuclear Antibodies Direct    C-reactive Protein   3. Essential hypertension     4. Type 2 diabetes mellitus without complication, without long-term current use of insulin.     5. Dyslipidemia     6. Class 2 obesity without serious comorbidity with body mass index (BMI) of 37.0 to 37.9 in adult, unspecified obesity type           Recommendations:  1. Continue aspirin 81 mg daily and add metoprolol 25 mg p.o. twice daily.  2. Evaluate her chest pains further with a Lexiscan sestamibi study.  3. Check LEONOR and CRP to screen for any  autoimmune or infectious etiology for her pericardial effusion.  4. We will monitor her pericardial effusion with repeat echo in a few weeks.    Return in about 4 weeks (around 4/4/2019).    As always, Pallavi   I appreciate very much the opportunity to participate in the cardiovascular care of your patients. Please do not hesitate to call me with any questions with regards to Vivian Collette's evaluation and management.       With Best Regards,        Yadiel Bruno MD, FAC    Dragon disclaimer:  Much of this encounter note is an electronic transcription/translation of spoken language to printed text. The electronic translation of spoken language may permit erroneous, or at times, nonsensical words or phrases to be inadvertently transcribed; Although I have reviewed the note for such errors, some may still exist.

## 2019-03-11 LAB — ANA SER QL: NEGATIVE

## 2019-03-27 ENCOUNTER — APPOINTMENT (OUTPATIENT)
Dept: GENERAL RADIOLOGY | Facility: HOSPITAL | Age: 54
End: 2019-03-27

## 2019-03-27 ENCOUNTER — HOSPITAL ENCOUNTER (OUTPATIENT)
Facility: HOSPITAL | Age: 54
Setting detail: OBSERVATION
Discharge: HOME | End: 2019-03-28
Attending: EMERGENCY MEDICINE | Admitting: INTERNAL MEDICINE

## 2019-03-27 ENCOUNTER — OFFICE VISIT (OUTPATIENT)
Dept: PULMONOLOGY | Facility: CLINIC | Age: 54
End: 2019-03-27

## 2019-03-27 VITALS
HEART RATE: 92 BPM | TEMPERATURE: 98 F | WEIGHT: 215.4 LBS | OXYGEN SATURATION: 91 % | SYSTOLIC BLOOD PRESSURE: 108 MMHG | BODY MASS INDEX: 31.9 KG/M2 | DIASTOLIC BLOOD PRESSURE: 72 MMHG | HEIGHT: 69 IN

## 2019-03-27 DIAGNOSIS — J43.2 CENTRILOBULAR EMPHYSEMA (HCC): ICD-10-CM

## 2019-03-27 DIAGNOSIS — R40.0 DAYTIME SLEEPINESS: ICD-10-CM

## 2019-03-27 DIAGNOSIS — R07.89 CHEST TIGHTNESS: ICD-10-CM

## 2019-03-27 DIAGNOSIS — G47.33 OSA (OBSTRUCTIVE SLEEP APNEA): ICD-10-CM

## 2019-03-27 DIAGNOSIS — R07.9 CHEST PAIN, UNSPECIFIED TYPE: Primary | ICD-10-CM

## 2019-03-27 DIAGNOSIS — R06.02 SHORTNESS OF BREATH: Primary | ICD-10-CM

## 2019-03-27 LAB
ALBUMIN SERPL-MCNC: 4.44 G/DL (ref 3.5–5.2)
ALBUMIN/GLOB SERPL: 1.2 G/DL
ALP SERPL-CCNC: 89 U/L (ref 39–117)
ALT SERPL W P-5'-P-CCNC: 30 U/L (ref 1–33)
ANION GAP SERPL CALCULATED.3IONS-SCNC: 14 MMOL/L
AST SERPL-CCNC: 21 U/L (ref 1–32)
BASOPHILS # BLD AUTO: 0.03 10*3/MM3 (ref 0–0.2)
BASOPHILS NFR BLD AUTO: 0.3 % (ref 0–1.5)
BILIRUB SERPL-MCNC: 0.2 MG/DL (ref 0.2–1.2)
BUN BLD-MCNC: 14 MG/DL (ref 6–20)
BUN/CREAT SERPL: 17.1 (ref 7–25)
CALCIUM SPEC-SCNC: 9.9 MG/DL (ref 8.6–10.5)
CHLORIDE SERPL-SCNC: 91 MMOL/L (ref 98–107)
CO2 SERPL-SCNC: 24 MMOL/L (ref 22–29)
CREAT BLD-MCNC: 0.82 MG/DL (ref 0.57–1)
D DIMER PPP FEU-MCNC: 0.34 MCGFEU/ML (ref 0–0.5)
DEPRECATED RDW RBC AUTO: 43 FL (ref 37–54)
EOSINOPHIL # BLD AUTO: 0.31 10*3/MM3 (ref 0–0.4)
EOSINOPHIL NFR BLD AUTO: 2.8 % (ref 0.3–6.2)
ERYTHROCYTE [DISTWIDTH] IN BLOOD BY AUTOMATED COUNT: 14.1 % (ref 12.3–15.4)
GFR SERPL CREATININE-BSD FRML MDRD: 72 ML/MIN/1.73
GLOBULIN UR ELPH-MCNC: 3.6 GM/DL
GLUCOSE BLD-MCNC: 307 MG/DL (ref 65–99)
GLUCOSE BLDC GLUCOMTR-MCNC: 204 MG/DL (ref 70–130)
HCT VFR BLD AUTO: 42.4 % (ref 34–46.6)
HGB BLD-MCNC: 14.1 G/DL (ref 12–15.9)
HOLD SPECIMEN: NORMAL
HOLD SPECIMEN: NORMAL
IMM GRANULOCYTES # BLD AUTO: 0.04 10*3/MM3 (ref 0–0.05)
IMM GRANULOCYTES NFR BLD AUTO: 0.4 % (ref 0–0.5)
LIPASE SERPL-CCNC: 82 U/L (ref 13–60)
LYMPHOCYTES # BLD AUTO: 4.19 10*3/MM3 (ref 0.7–3.1)
LYMPHOCYTES NFR BLD AUTO: 38.4 % (ref 19.6–45.3)
MCH RBC QN AUTO: 28.5 PG (ref 26.6–33)
MCHC RBC AUTO-ENTMCNC: 33.3 G/DL (ref 31.5–35.7)
MCV RBC AUTO: 85.7 FL (ref 79–97)
MONOCYTES # BLD AUTO: 0.61 10*3/MM3 (ref 0.1–0.9)
MONOCYTES NFR BLD AUTO: 5.6 % (ref 5–12)
NEUTROPHILS # BLD AUTO: 5.72 10*3/MM3 (ref 1.4–7)
NEUTROPHILS NFR BLD AUTO: 52.5 % (ref 42.7–76)
NT-PROBNP SERPL-MCNC: 15 PG/ML (ref 5–900)
PLATELET # BLD AUTO: 320 10*3/MM3 (ref 140–450)
PMV BLD AUTO: 11.3 FL (ref 6–12)
POTASSIUM BLD-SCNC: 4.7 MMOL/L (ref 3.5–5.2)
PROT SERPL-MCNC: 8 G/DL (ref 6–8.5)
RBC # BLD AUTO: 4.95 10*6/MM3 (ref 3.77–5.28)
SODIUM BLD-SCNC: 129 MMOL/L (ref 136–145)
TROPONIN T SERPL-MCNC: <0.01 NG/ML (ref 0–0.03)
WBC NRBC COR # BLD: 10.9 10*3/MM3 (ref 3.4–10.8)
WHOLE BLOOD HOLD SPECIMEN: NORMAL
WHOLE BLOOD HOLD SPECIMEN: NORMAL

## 2019-03-27 PROCEDURE — 85379 FIBRIN DEGRADATION QUANT: CPT | Performed by: EMERGENCY MEDICINE

## 2019-03-27 PROCEDURE — 83880 ASSAY OF NATRIURETIC PEPTIDE: CPT | Performed by: EMERGENCY MEDICINE

## 2019-03-27 PROCEDURE — 80053 COMPREHEN METABOLIC PANEL: CPT | Performed by: EMERGENCY MEDICINE

## 2019-03-27 PROCEDURE — 83690 ASSAY OF LIPASE: CPT | Performed by: EMERGENCY MEDICINE

## 2019-03-27 PROCEDURE — G0378 HOSPITAL OBSERVATION PER HR: HCPCS

## 2019-03-27 PROCEDURE — 71046 X-RAY EXAM CHEST 2 VIEWS: CPT

## 2019-03-27 PROCEDURE — 93010 ELECTROCARDIOGRAM REPORT: CPT | Performed by: INTERNAL MEDICINE

## 2019-03-27 PROCEDURE — 96374 THER/PROPH/DIAG INJ IV PUSH: CPT

## 2019-03-27 PROCEDURE — 99214 OFFICE O/P EST MOD 30 MIN: CPT | Performed by: PHYSICIAN ASSISTANT

## 2019-03-27 PROCEDURE — 96375 TX/PRO/DX INJ NEW DRUG ADDON: CPT

## 2019-03-27 PROCEDURE — 25010000002 ONDANSETRON PER 1 MG: Performed by: EMERGENCY MEDICINE

## 2019-03-27 PROCEDURE — 96361 HYDRATE IV INFUSION ADD-ON: CPT

## 2019-03-27 PROCEDURE — 84484 ASSAY OF TROPONIN QUANT: CPT | Performed by: EMERGENCY MEDICINE

## 2019-03-27 PROCEDURE — 93005 ELECTROCARDIOGRAM TRACING: CPT | Performed by: EMERGENCY MEDICINE

## 2019-03-27 PROCEDURE — 84484 ASSAY OF TROPONIN QUANT: CPT | Performed by: INTERNAL MEDICINE

## 2019-03-27 PROCEDURE — 99285 EMERGENCY DEPT VISIT HI MDM: CPT

## 2019-03-27 PROCEDURE — 85025 COMPLETE CBC W/AUTO DIFF WBC: CPT | Performed by: EMERGENCY MEDICINE

## 2019-03-27 PROCEDURE — 25010000002 MORPHINE PER 10 MG: Performed by: EMERGENCY MEDICINE

## 2019-03-27 PROCEDURE — 94799 UNLISTED PULMONARY SVC/PX: CPT

## 2019-03-27 PROCEDURE — 93005 ELECTROCARDIOGRAM TRACING: CPT | Performed by: INTERNAL MEDICINE

## 2019-03-27 PROCEDURE — 82962 GLUCOSE BLOOD TEST: CPT

## 2019-03-27 PROCEDURE — 71046 X-RAY EXAM CHEST 2 VIEWS: CPT | Performed by: RADIOLOGY

## 2019-03-27 RX ORDER — SODIUM CHLORIDE 0.9 % (FLUSH) 0.9 %
3 SYRINGE (ML) INJECTION EVERY 12 HOURS SCHEDULED
Status: DISCONTINUED | OUTPATIENT
Start: 2019-03-27 | End: 2019-03-28 | Stop reason: HOSPADM

## 2019-03-27 RX ORDER — CETIRIZINE HYDROCHLORIDE 10 MG/1
10 TABLET ORAL DAILY
COMMUNITY
End: 2019-04-22 | Stop reason: ALTCHOICE

## 2019-03-27 RX ORDER — ASPIRIN 325 MG
325 TABLET ORAL ONCE
Status: DISCONTINUED | OUTPATIENT
Start: 2019-03-27 | End: 2019-03-27

## 2019-03-27 RX ORDER — SODIUM CHLORIDE 0.9 % (FLUSH) 0.9 %
3-10 SYRINGE (ML) INJECTION AS NEEDED
Status: DISCONTINUED | OUTPATIENT
Start: 2019-03-27 | End: 2019-03-28 | Stop reason: HOSPADM

## 2019-03-27 RX ORDER — DEXTROSE MONOHYDRATE 25 G/50ML
25 INJECTION, SOLUTION INTRAVENOUS
Status: DISCONTINUED | OUTPATIENT
Start: 2019-03-27 | End: 2019-03-28 | Stop reason: HOSPADM

## 2019-03-27 RX ORDER — ASPIRIN 81 MG/1
81 TABLET ORAL DAILY
Status: DISCONTINUED | OUTPATIENT
Start: 2019-03-28 | End: 2019-03-28 | Stop reason: HOSPADM

## 2019-03-27 RX ORDER — CETIRIZINE HYDROCHLORIDE 10 MG/1
10 TABLET ORAL DAILY
Status: DISCONTINUED | OUTPATIENT
Start: 2019-03-28 | End: 2019-03-28 | Stop reason: HOSPADM

## 2019-03-27 RX ORDER — NITROGLYCERIN 0.4 MG/1
0.4 TABLET SUBLINGUAL
Status: DISCONTINUED | OUTPATIENT
Start: 2019-03-27 | End: 2019-03-28 | Stop reason: HOSPADM

## 2019-03-27 RX ORDER — CALCIUM CARBONATE 200(500)MG
3 TABLET,CHEWABLE ORAL 2 TIMES DAILY PRN
Status: DISCONTINUED | OUTPATIENT
Start: 2019-03-27 | End: 2019-03-28 | Stop reason: HOSPADM

## 2019-03-27 RX ORDER — ASPIRIN 81 MG/1
324 TABLET, CHEWABLE ORAL ONCE
Status: COMPLETED | OUTPATIENT
Start: 2019-03-27 | End: 2019-03-27

## 2019-03-27 RX ORDER — ATORVASTATIN CALCIUM 10 MG/1
10 TABLET, FILM COATED ORAL DAILY
Status: DISCONTINUED | OUTPATIENT
Start: 2019-03-28 | End: 2019-03-28 | Stop reason: HOSPADM

## 2019-03-27 RX ORDER — GLIPIZIDE 5 MG/1
5 TABLET ORAL DAILY PRN
Status: DISCONTINUED | OUTPATIENT
Start: 2019-03-27 | End: 2019-03-28 | Stop reason: HOSPADM

## 2019-03-27 RX ORDER — SODIUM CHLORIDE 9 MG/ML
125 INJECTION, SOLUTION INTRAVENOUS CONTINUOUS
Status: DISCONTINUED | OUTPATIENT
Start: 2019-03-27 | End: 2019-03-28

## 2019-03-27 RX ORDER — BUSPIRONE HYDROCHLORIDE 10 MG/1
10 TABLET ORAL 2 TIMES DAILY
Status: DISCONTINUED | OUTPATIENT
Start: 2019-03-27 | End: 2019-03-28 | Stop reason: HOSPADM

## 2019-03-27 RX ORDER — NICOTINE POLACRILEX 4 MG
15 LOZENGE BUCCAL
Status: DISCONTINUED | OUTPATIENT
Start: 2019-03-27 | End: 2019-03-28 | Stop reason: HOSPADM

## 2019-03-27 RX ORDER — MONTELUKAST SODIUM 10 MG/1
10 TABLET ORAL NIGHTLY
Status: DISCONTINUED | OUTPATIENT
Start: 2019-03-27 | End: 2019-03-28 | Stop reason: HOSPADM

## 2019-03-27 RX ORDER — ONDANSETRON 4 MG/1
4 TABLET, FILM COATED ORAL EVERY 6 HOURS PRN
Status: DISCONTINUED | OUTPATIENT
Start: 2019-03-27 | End: 2019-03-28 | Stop reason: HOSPADM

## 2019-03-27 RX ORDER — NALOXONE HCL 0.4 MG/ML
0.4 VIAL (ML) INJECTION
Status: DISCONTINUED | OUTPATIENT
Start: 2019-03-27 | End: 2019-03-28 | Stop reason: HOSPADM

## 2019-03-27 RX ORDER — ONDANSETRON 2 MG/ML
4 INJECTION INTRAMUSCULAR; INTRAVENOUS ONCE
Status: COMPLETED | OUTPATIENT
Start: 2019-03-27 | End: 2019-03-27

## 2019-03-27 RX ORDER — SODIUM CHLORIDE 0.9 % (FLUSH) 0.9 %
10 SYRINGE (ML) INJECTION AS NEEDED
Status: DISCONTINUED | OUTPATIENT
Start: 2019-03-27 | End: 2019-03-28 | Stop reason: HOSPADM

## 2019-03-27 RX ORDER — ACETAMINOPHEN 325 MG/1
650 TABLET ORAL EVERY 4 HOURS PRN
Status: DISCONTINUED | OUTPATIENT
Start: 2019-03-27 | End: 2019-03-28 | Stop reason: HOSPADM

## 2019-03-27 RX ORDER — MORPHINE SULFATE 2 MG/ML
1 INJECTION, SOLUTION INTRAMUSCULAR; INTRAVENOUS EVERY 4 HOURS PRN
Status: DISCONTINUED | OUTPATIENT
Start: 2019-03-27 | End: 2019-03-28 | Stop reason: HOSPADM

## 2019-03-27 RX ORDER — NAPROXEN 250 MG/1
500 TABLET ORAL 2 TIMES DAILY WITH MEALS
Status: DISCONTINUED | OUTPATIENT
Start: 2019-03-27 | End: 2019-03-28 | Stop reason: HOSPADM

## 2019-03-27 RX ORDER — FLUOXETINE HYDROCHLORIDE 20 MG/1
40 CAPSULE ORAL NIGHTLY
Status: DISCONTINUED | OUTPATIENT
Start: 2019-03-27 | End: 2019-03-28 | Stop reason: HOSPADM

## 2019-03-27 RX ADMIN — ASPIRIN 324 MG: 81 TABLET, CHEWABLE ORAL at 17:44

## 2019-03-27 RX ADMIN — MORPHINE SULFATE 2 MG: 4 INJECTION, SOLUTION INTRAMUSCULAR; INTRAVENOUS at 17:42

## 2019-03-27 RX ADMIN — NITROGLYCERIN 0.5 INCH: 20 OINTMENT TOPICAL at 17:44

## 2019-03-27 RX ADMIN — ONDANSETRON 4 MG: 2 INJECTION INTRAMUSCULAR; INTRAVENOUS at 17:40

## 2019-03-27 RX ADMIN — SODIUM CHLORIDE 125 ML/HR: 9 INJECTION, SOLUTION INTRAVENOUS at 17:40

## 2019-03-28 VITALS
OXYGEN SATURATION: 95 % | DIASTOLIC BLOOD PRESSURE: 70 MMHG | WEIGHT: 214.7 LBS | SYSTOLIC BLOOD PRESSURE: 111 MMHG | TEMPERATURE: 97.9 F | HEART RATE: 89 BPM | BODY MASS INDEX: 34.51 KG/M2 | HEIGHT: 66 IN | RESPIRATION RATE: 18 BRPM

## 2019-03-28 LAB
ALBUMIN SERPL-MCNC: 3.83 G/DL (ref 3.5–5.2)
ALBUMIN/GLOB SERPL: 1.2 G/DL
ALP SERPL-CCNC: 74 U/L (ref 39–117)
ALT SERPL W P-5'-P-CCNC: 28 U/L (ref 1–33)
ANION GAP SERPL CALCULATED.3IONS-SCNC: 15.4 MMOL/L
AST SERPL-CCNC: 22 U/L (ref 1–32)
BASOPHILS # BLD AUTO: 0.02 10*3/MM3 (ref 0–0.2)
BASOPHILS NFR BLD AUTO: 0.2 % (ref 0–1.5)
BILIRUB SERPL-MCNC: 0.2 MG/DL (ref 0.2–1.2)
BUN BLD-MCNC: 12 MG/DL (ref 6–20)
BUN/CREAT SERPL: 15 (ref 7–25)
CALCIUM SPEC-SCNC: 9.1 MG/DL (ref 8.6–10.5)
CHLORIDE SERPL-SCNC: 94 MMOL/L (ref 98–107)
CO2 SERPL-SCNC: 22.6 MMOL/L (ref 22–29)
CREAT BLD-MCNC: 0.8 MG/DL (ref 0.57–1)
DEPRECATED RDW RBC AUTO: 44 FL (ref 37–54)
EOSINOPHIL # BLD AUTO: 0.38 10*3/MM3 (ref 0–0.4)
EOSINOPHIL NFR BLD AUTO: 3.6 % (ref 0.3–6.2)
ERYTHROCYTE [DISTWIDTH] IN BLOOD BY AUTOMATED COUNT: 14.2 % (ref 12.3–15.4)
GFR SERPL CREATININE-BSD FRML MDRD: 74 ML/MIN/1.73
GLOBULIN UR ELPH-MCNC: 3.2 GM/DL
GLUCOSE BLD-MCNC: 319 MG/DL (ref 65–99)
GLUCOSE BLDC GLUCOMTR-MCNC: 246 MG/DL (ref 70–130)
GLUCOSE BLDC GLUCOMTR-MCNC: 247 MG/DL (ref 70–130)
GLUCOSE BLDC GLUCOMTR-MCNC: 261 MG/DL (ref 70–130)
HCT VFR BLD AUTO: 39.6 % (ref 34–46.6)
HGB BLD-MCNC: 13.1 G/DL (ref 12–15.9)
IMM GRANULOCYTES # BLD AUTO: 0.02 10*3/MM3 (ref 0–0.05)
IMM GRANULOCYTES NFR BLD AUTO: 0.2 % (ref 0–0.5)
LYMPHOCYTES # BLD AUTO: 4.69 10*3/MM3 (ref 0.7–3.1)
LYMPHOCYTES NFR BLD AUTO: 44.6 % (ref 19.6–45.3)
MAGNESIUM SERPL-MCNC: 1.7 MG/DL (ref 1.6–2.6)
MCH RBC QN AUTO: 28.7 PG (ref 26.6–33)
MCHC RBC AUTO-ENTMCNC: 33.1 G/DL (ref 31.5–35.7)
MCV RBC AUTO: 86.7 FL (ref 79–97)
MONOCYTES # BLD AUTO: 0.62 10*3/MM3 (ref 0.1–0.9)
MONOCYTES NFR BLD AUTO: 5.9 % (ref 5–12)
NEUTROPHILS # BLD AUTO: 4.79 10*3/MM3 (ref 1.4–7)
NEUTROPHILS NFR BLD AUTO: 45.5 % (ref 42.7–76)
NRBC BLD AUTO-RTO: ABNORMAL /100 WBC (ref 0–0)
NT-PROBNP SERPL-MCNC: 9.5 PG/ML (ref 5–900)
PLATELET # BLD AUTO: 276 10*3/MM3 (ref 140–450)
PMV BLD AUTO: 11.6 FL (ref 6–12)
POTASSIUM BLD-SCNC: 4 MMOL/L (ref 3.5–5.2)
PROT SERPL-MCNC: 7 G/DL (ref 6–8.5)
RBC # BLD AUTO: 4.57 10*6/MM3 (ref 3.77–5.28)
SODIUM BLD-SCNC: 132 MMOL/L (ref 136–145)
TROPONIN T SERPL-MCNC: <0.01 NG/ML (ref 0–0.03)
TROPONIN T SERPL-MCNC: <0.01 NG/ML (ref 0–0.03)
WBC NRBC COR # BLD: 10.52 10*3/MM3 (ref 3.4–10.8)

## 2019-03-28 PROCEDURE — 25010000002 ENOXAPARIN PER 10 MG: Performed by: INTERNAL MEDICINE

## 2019-03-28 PROCEDURE — 63710000001 INSULIN ASPART PER 5 UNITS: Performed by: INTERNAL MEDICINE

## 2019-03-28 PROCEDURE — 83735 ASSAY OF MAGNESIUM: CPT | Performed by: INTERNAL MEDICINE

## 2019-03-28 PROCEDURE — 85027 COMPLETE CBC AUTOMATED: CPT | Performed by: INTERNAL MEDICINE

## 2019-03-28 PROCEDURE — 83880 ASSAY OF NATRIURETIC PEPTIDE: CPT | Performed by: INTERNAL MEDICINE

## 2019-03-28 PROCEDURE — 84484 ASSAY OF TROPONIN QUANT: CPT | Performed by: INTERNAL MEDICINE

## 2019-03-28 PROCEDURE — 96372 THER/PROPH/DIAG INJ SC/IM: CPT

## 2019-03-28 PROCEDURE — 82962 GLUCOSE BLOOD TEST: CPT

## 2019-03-28 PROCEDURE — 80053 COMPREHEN METABOLIC PANEL: CPT | Performed by: INTERNAL MEDICINE

## 2019-03-28 PROCEDURE — 96361 HYDRATE IV INFUSION ADD-ON: CPT

## 2019-03-28 PROCEDURE — G0378 HOSPITAL OBSERVATION PER HR: HCPCS

## 2019-03-28 RX ADMIN — METOPROLOL TARTRATE 25 MG: 25 TABLET, FILM COATED ORAL at 09:06

## 2019-03-28 RX ADMIN — SODIUM CHLORIDE, PRESERVATIVE FREE 3 ML: 5 INJECTION INTRAVENOUS at 00:10

## 2019-03-28 RX ADMIN — ENOXAPARIN SODIUM 40 MG: 40 INJECTION SUBCUTANEOUS at 00:09

## 2019-03-28 RX ADMIN — METFORMIN HYDROCHLORIDE 500 MG: 500 TABLET ORAL at 09:06

## 2019-03-28 RX ADMIN — INSULIN ASPART 6 UNITS: 100 INJECTION, SOLUTION INTRAVENOUS; SUBCUTANEOUS at 17:54

## 2019-03-28 RX ADMIN — BUSPIRONE HYDROCHLORIDE 10 MG: 10 TABLET ORAL at 09:06

## 2019-03-28 RX ADMIN — FLUOXETINE 40 MG: 20 CAPSULE ORAL at 00:08

## 2019-03-28 RX ADMIN — CETIRIZINE HCL 10 MG: 10 TABLET ORAL at 09:06

## 2019-03-28 RX ADMIN — SODIUM CHLORIDE, PRESERVATIVE FREE 3 ML: 5 INJECTION INTRAVENOUS at 09:07

## 2019-03-28 RX ADMIN — INSULIN ASPART 4 UNITS: 100 INJECTION, SOLUTION INTRAVENOUS; SUBCUTANEOUS at 09:18

## 2019-03-28 RX ADMIN — ASPIRIN 81 MG: 81 TABLET ORAL at 09:06

## 2019-03-28 RX ADMIN — BUSPIRONE HYDROCHLORIDE 10 MG: 10 TABLET ORAL at 00:09

## 2019-03-28 RX ADMIN — INSULIN ASPART 4 UNITS: 100 INJECTION, SOLUTION INTRAVENOUS; SUBCUTANEOUS at 00:09

## 2019-03-28 RX ADMIN — ATORVASTATIN CALCIUM 10 MG: 10 TABLET, FILM COATED ORAL at 09:06

## 2019-03-28 RX ADMIN — NAPROXEN 500 MG: 250 TABLET ORAL at 17:53

## 2019-03-28 RX ADMIN — NAPROXEN 500 MG: 250 TABLET ORAL at 09:07

## 2019-03-28 RX ADMIN — INSULIN ASPART 4 UNITS: 100 INJECTION, SOLUTION INTRAVENOUS; SUBCUTANEOUS at 12:27

## 2019-03-28 RX ADMIN — MONTELUKAST SODIUM 10 MG: 10 TABLET, COATED ORAL at 00:09

## 2019-03-28 RX ADMIN — NAPROXEN 500 MG: 250 TABLET ORAL at 00:08

## 2019-04-03 ENCOUNTER — TELEPHONE (OUTPATIENT)
Dept: PULMONOLOGY | Facility: CLINIC | Age: 54
End: 2019-04-03

## 2019-04-03 DIAGNOSIS — J43.2 CENTRILOBULAR EMPHYSEMA (HCC): Primary | ICD-10-CM

## 2019-04-04 NOTE — TELEPHONE ENCOUNTER
Called patient to discuss and reminded of ordering of home sleep study due to high suspicion for sleep apnea.    Also discussed that we will switch current albuterol and Symbicort inhalers to Atrovent short-acting inhaler for 4 times daily use as needed and Arnuity Ellipta for once daily use.  Switching to avoid agitation of chest pain until stress test is completed.   Instructed to rinse mouth following use of our annuity.  Patient conveyed understanding.   Discussed that she could ask the pharmacist to explain again or call the office as needed for questions.    Reminded her that I had also ordered for small portable tanks of supplemental oxygen to use throughout the day continuously.  Also nebulizer machine and Atrovent nebulizer treatments  to use for worsening shortness of breath/wheezing.   She conveyed understanding and appreciated call.

## 2019-04-10 ENCOUNTER — HOSPITAL ENCOUNTER (OUTPATIENT)
Dept: CT IMAGING | Facility: HOSPITAL | Age: 54
Discharge: HOME | End: 2019-04-10
Admitting: NURSE PRACTITIONER

## 2019-04-10 DIAGNOSIS — J43.2 CENTRILOBULAR EMPHYSEMA (HCC): ICD-10-CM

## 2019-04-10 DIAGNOSIS — R06.02 SHORTNESS OF BREATH: ICD-10-CM

## 2019-04-10 PROCEDURE — 71250 CT THORAX DX C-: CPT | Performed by: RADIOLOGY

## 2019-04-10 PROCEDURE — 71250 CT THORAX DX C-: CPT

## 2019-04-11 ENCOUNTER — HOSPITAL ENCOUNTER (OUTPATIENT)
Dept: NUCLEAR MEDICINE | Facility: HOSPITAL | Age: 54
Discharge: HOME | End: 2019-04-11

## 2019-04-11 ENCOUNTER — HOSPITAL ENCOUNTER (OUTPATIENT)
Dept: CARDIOLOGY | Facility: HOSPITAL | Age: 54
Discharge: HOME | End: 2019-04-11

## 2019-04-11 DIAGNOSIS — R07.2 PRECORDIAL PAIN: ICD-10-CM

## 2019-04-11 PROCEDURE — A9500 TC99M SESTAMIBI: HCPCS | Performed by: INTERNAL MEDICINE

## 2019-04-11 PROCEDURE — 78452 HT MUSCLE IMAGE SPECT MULT: CPT

## 2019-04-11 PROCEDURE — 93018 CV STRESS TEST I&R ONLY: CPT | Performed by: INTERNAL MEDICINE

## 2019-04-11 PROCEDURE — 78452 HT MUSCLE IMAGE SPECT MULT: CPT | Performed by: INTERNAL MEDICINE

## 2019-04-11 PROCEDURE — 0 TECHNETIUM SESTAMIBI: Performed by: INTERNAL MEDICINE

## 2019-04-11 PROCEDURE — 93017 CV STRESS TEST TRACING ONLY: CPT

## 2019-04-11 PROCEDURE — 25010000002 REGADENOSON 0.4 MG/5ML SOLUTION: Performed by: INTERNAL MEDICINE

## 2019-04-11 RX ADMIN — TECHNETIUM TC 99M SESTAMIBI 1 DOSE: 1 INJECTION INTRAVENOUS at 09:22

## 2019-04-11 RX ADMIN — REGADENOSON 0.4 MG: 0.08 INJECTION, SOLUTION INTRAVENOUS at 09:22

## 2019-04-11 RX ADMIN — TECHNETIUM TC 99M SESTAMIBI 1 DOSE: 1 INJECTION INTRAVENOUS at 07:37

## 2019-04-14 LAB
BH CV NUCLEAR PRIOR STUDY: 3
BH CV STRESS BP STAGE 1: NORMAL
BH CV STRESS BP STAGE 2: NORMAL
BH CV STRESS COMMENTS STAGE 1: NORMAL
BH CV STRESS COMMENTS STAGE 2: NORMAL
BH CV STRESS DOSE REGADENOSON STAGE 1: 0.4
BH CV STRESS DURATION MIN STAGE 1: 0
BH CV STRESS DURATION MIN STAGE 2: 4
BH CV STRESS DURATION SEC STAGE 1: 10
BH CV STRESS DURATION SEC STAGE 2: 0
BH CV STRESS HR STAGE 1: 96
BH CV STRESS HR STAGE 2: 103
BH CV STRESS PROTOCOL 1: NORMAL
BH CV STRESS RECOVERY BP: NORMAL MMHG
BH CV STRESS RECOVERY HR: 80 BPM
BH CV STRESS STAGE 1: 1
BH CV STRESS STAGE 2: 2
MAXIMAL PREDICTED HEART RATE: 165 BPM
PERCENT MAX PREDICTED HR: 62.42 %
STRESS BASELINE BP: NORMAL MMHG
STRESS BASELINE HR: 69 BPM
STRESS PERCENT HR: 73 %
STRESS POST PEAK BP: NORMAL MMHG
STRESS POST PEAK HR: 103 BPM
STRESS TARGET HR: 140 BPM

## 2019-04-15 ENCOUNTER — DOCUMENTATION (OUTPATIENT)
Dept: PULMONOLOGY | Facility: CLINIC | Age: 54
End: 2019-04-15

## 2019-04-15 NOTE — PROGRESS NOTES
CT scan reviewed.  Per radiology report a 4 mm nodule is noted in her lungs.  A 12-month follow-up is recommended.

## 2019-04-16 ENCOUNTER — TELEPHONE (OUTPATIENT)
Dept: PULMONOLOGY | Facility: CLINIC | Age: 54
End: 2019-04-16

## 2019-04-16 NOTE — TELEPHONE ENCOUNTER
Pallavi Jones, NAEEM [place], Rigoberto Barber MA             We let her know that her CT was reviewed and it shows a tiny pulmonary nodule that is about 4 mm in size.  It is felt to be benign and we  will do a repeat CT scan in 1 year to further evaluate for any changes.

## 2019-04-22 ENCOUNTER — OFFICE VISIT (OUTPATIENT)
Dept: CARDIOLOGY | Facility: CLINIC | Age: 54
End: 2019-04-22

## 2019-04-22 VITALS
BODY MASS INDEX: 31.25 KG/M2 | HEIGHT: 69 IN | WEIGHT: 211 LBS | DIASTOLIC BLOOD PRESSURE: 82 MMHG | HEART RATE: 68 BPM | SYSTOLIC BLOOD PRESSURE: 120 MMHG | OXYGEN SATURATION: 95 %

## 2019-04-22 DIAGNOSIS — I31.39 PERICARDIAL EFFUSION: ICD-10-CM

## 2019-04-22 DIAGNOSIS — R94.39 ABNORMAL STRESS TEST: ICD-10-CM

## 2019-04-22 DIAGNOSIS — R07.2 PRECORDIAL PAIN: Primary | ICD-10-CM

## 2019-04-22 DIAGNOSIS — E78.5 DYSLIPIDEMIA: ICD-10-CM

## 2019-04-22 DIAGNOSIS — I10 ESSENTIAL HYPERTENSION: ICD-10-CM

## 2019-04-22 PROCEDURE — 99214 OFFICE O/P EST MOD 30 MIN: CPT | Performed by: PHYSICIAN ASSISTANT

## 2019-04-22 RX ORDER — FLUTICASONE PROPIONATE 50 MCG
2 SPRAY, SUSPENSION (ML) NASAL DAILY
COMMUNITY

## 2019-04-22 RX ORDER — LORATADINE 10 MG/1
CAPSULE, LIQUID FILLED ORAL
COMMUNITY

## 2019-04-22 RX ORDER — ISOSORBIDE MONONITRATE 30 MG/1
30 TABLET, EXTENDED RELEASE ORAL EVERY MORNING
Qty: 30 TABLET | Refills: 5 | Status: SHIPPED | OUTPATIENT
Start: 2019-04-22 | End: 2019-06-03 | Stop reason: SDUPTHER

## 2019-04-22 RX ORDER — NITROGLYCERIN 0.4 MG/1
0.4 TABLET SUBLINGUAL
Qty: 25 TABLET | Refills: 1 | Status: SHIPPED | OUTPATIENT
Start: 2019-04-22

## 2019-04-22 NOTE — PROGRESS NOTES
"Nelly Clay PA  Vivian Collette  1965  04/22/2019    Patient Active Problem List   Diagnosis   • Shortness of breath   • Other fatigue   • ESTEVAN (obstructive sleep apnea)   • Centrilobular emphysema (CMS/HCC)   • Precordial pain   • Essential hypertension   • Type 2 diabetes mellitus without complication, without long-term current use of insulin (CMS/HCC)   • Dyslipidemia   • Class 2 obesity in adult   • Pericardial effusion (small circumferential with no tamponade) noted on recent echo Doppler study on 1/29/2019.   • Chest pain   • Chest tightness   • Abnormal stress test       Dear Nelly Clay PA:    Chief Complaint   Patient presents with   • Chest Pain     [facility] d/c 3/28/19. Stress and echo completed.   • Med Management     bottles presented.     Subjective Vivian Collette is a 54 y.o. female with a past medical history significant for hypertension, type 2 diabetes mellitus, dyslipidemia, and recent complaints of chest pain.  She was recently admitted to [facility] observation unit 3/27/19 through 3/28/19 due to complaints of chest pain.  She ruled out for acute myocardial infarction and was subsequently set up with an outpatient nuclear stress test on 4/11/2019.  This was abnormal secondary to a small size, mild degree of anterior ischemia.  There was normal LV wall motion and it was felt that this was a low risk study.  Previous transthoracic echocardiogram in February 2019 showed a normal EF at 61-65%, grade 1 diastolic dysfunction, and a small less than 1 cm pericardial effusion with no evidence of tamponade.    She presents back to the office today for a follow-up visit.  She continues to have \"pinching\" type chest pain associated with shortness of breath.  It radiates into her back.  It is not brought on by any particular factors.  In the hospital, she had relief with nitroglycerin, but reports that she was not discharged home on any.  She has been taking aspirin and " metoprolol regularly.  She denies any prior left heart catheterization.      Current Outpatient Medications:   •  CHARY LOW DOSE 81 MG EC tablet, Take 81 mg by mouth Daily., Disp: , Rfl:   •  busPIRone (BUSPAR) 10 MG tablet, Take 10 mg by mouth 2 (Two) Times a Day., Disp: , Rfl:   •  diclofenac (VOLTAREN) 50 MG EC tablet, Take 50 mg by mouth 2 (Two) Times a Day., Disp: , Rfl:   •  FLUoxetine (PROzac) 40 MG capsule, Take 40 mg by mouth Every Night., Disp: , Rfl:   •  fluticasone (FLONASE) 50 MCG/ACT nasal spray, 2 sprays into the nostril(s) as directed by provider Daily., Disp: , Rfl:   •  Fluticasone Furoate 100 MCG/ACT aerosol powder , Inhale 1 puff Daily., Disp: 30 each, Rfl: 8  •  glyBURIDE (DIAbeta) 5 MG tablet, Take 5 mg by mouth Daily As Needed. Prior to [facility] Admission, Patient was on: only takes if glucose over 200, Disp: , Rfl:   •  ipratropium (ATROVENT) 0.02 % nebulizer solution, Take 2.5 mL by nebulization 4 (Four) Times a Day As Needed for Wheezing or Shortness of Air., Disp: 12.5 mL, Rfl: 6  •  Loratadine 10 MG capsule, Take  by mouth., Disp: , Rfl:   •  lovastatin (MEVACOR) 10 MG tablet, Take 10 mg by mouth Every Evening., Disp: , Rfl:   •  metFORMIN ER (GLUCOPHAGE-XR) 500 MG 24 hr tablet, Take 1,000 mg by mouth 2 (Two) Times a Day., Disp: , Rfl:   •  metoprolol tartrate (LOPRESSOR) 25 MG tablet, Take 1 tablet by mouth 2 (Two) Times a Day., Disp: 60 tablet, Rfl: 3  •  montelukast (SINGULAIR) 10 MG tablet, Take 10 mg by mouth Every Night., Disp: , Rfl:   •  ipratropium (ATROVENT HFA) 17 MCG/ACT inhaler, Inhale 2 puffs 4 (Four) Times a Day., Disp: 12.9 g, Rfl: 8    The following portions of the patient's history were reviewed and updated as appropriate: allergies, current medications, past family history, past medical history, past social history, past surgical history and problem list.    Social History     Socioeconomic History   • Marital status:      Spouse name: Not on file   • Number of  "children: Not on file   • Years of education: Not on file   • Highest education level: Not on file   Tobacco Use   • Smoking status: Former Smoker     Packs/day: 2.00     Types: Cigarettes     Last attempt to quit: 2012     Years since quittin.3   • Smokeless tobacco: Never Used   Substance and Sexual Activity   • Alcohol use: No     Frequency: Never   • Drug use: No     Review of Systems   Cardiovascular: Positive for chest pain (\"pinched feeling and pressure/heavy\". Pt not sure if it's her COPD or heart), dyspnea on exertion, leg swelling (feet and ankles, pt reports sometimes her face swells as well.) and palpitations (\"pounding feeling and fast\"). Negative for syncope.   Respiratory: Positive for shortness of breath (pt uses 2L O2).    Neurological: Positive for light-headedness.     Objective   Blood pressure 120/82, pulse 68, height 175.3 cm (69\"), weight 95.7 kg (211 lb), SpO2 95 %.  Body mass index is 31.16 kg/m².    Physical Exam   Constitutional: She is oriented to person, place, and time. She appears well-developed and well-nourished. No distress.   HENT:   Head: Normocephalic and atraumatic.   Eyes: Conjunctivae are normal. Right eye exhibits no discharge. Left eye exhibits no discharge.   Neck: Normal range of motion. Neck supple. Carotid bruit is not present.   Cardiovascular: Normal rate, regular rhythm and normal heart sounds. Exam reveals no gallop and no friction rub.   No murmur heard.  Pulmonary/Chest: Effort normal and breath sounds normal. No respiratory distress. She has no wheezes. She has no rales. She exhibits no tenderness.   Musculoskeletal: Normal range of motion. She exhibits no edema.   Neurological: She is alert and oriented to person, place, and time.   Skin: Skin is warm and dry. No rash noted. She is not diaphoretic. No erythema. No pallor.   Psychiatric: She has a normal mood and affect. Her behavior is normal.   Nursing note and vitals reviewed.      Procedures "         Assessment:        Diagnosis Plan   1. Precordial pain     2. Abnormal stress test     3. Pericardial effusion (small circumferential with no tamponade) noted on recent echo Doppler study on 1/29/2019.     4. Essential hypertension     5. Dyslipidemia          Plan:       1. Repeat limited echocardiogram to reevaluate degree of pericardial effusion.   2. Add isosorbide mononitrate 30mg daily.   3. She was given a RX for nitroglycerin 0.4mg as needed for recurrent chest pain.   4. I have discussed the results of her stress test and the option of further evaluation with left heart catheterization as well as the procedure, risks, benefits and alternatives. I have also discussed the option of optimizing medical therapy and close monitoring. She wishes to try medical therapy for now and would prefer to wait on the cath. I have explained that if she has any recurrent or increasing symptoms, to contact the office or return to the emergency department.  She expressed understanding and agree.  5. Follow up in 1 month or sooner if needed.     No Follow-up on file.    I appreciate the opportunity to participate in this patient's cardiovascular care.    Best Regards,    Migdalia Sorto PA-C

## 2019-05-01 ENCOUNTER — HOSPITAL ENCOUNTER (OUTPATIENT)
Dept: CARDIOLOGY | Facility: HOSPITAL | Age: 54
Discharge: HOME | End: 2019-05-01
Admitting: PHYSICIAN ASSISTANT

## 2019-05-01 DIAGNOSIS — I31.39 PERICARDIAL EFFUSION: ICD-10-CM

## 2019-05-01 PROCEDURE — 93308 TTE F-UP OR LMTD: CPT

## 2019-05-01 PROCEDURE — 93321 DOPPLER ECHO F-UP/LMTD STD: CPT

## 2019-05-01 PROCEDURE — 93308 TTE F-UP OR LMTD: CPT | Performed by: INTERNAL MEDICINE

## 2019-05-03 LAB
BH CV ECHO MEAS - BSA(HAYCOCK): 2.2 M^2
BH CV ECHO MEAS - BSA: 2.1 M^2
BH CV ECHO MEAS - BZI_BMI: 31.2 KILOGRAMS/M^2
BH CV ECHO MEAS - BZI_METRIC_HEIGHT: 175.3 CM
BH CV ECHO MEAS - BZI_METRIC_WEIGHT: 95.7 KG
MAXIMAL PREDICTED HEART RATE: 165 BPM
STRESS TARGET HR: 140 BPM

## 2019-05-17 ENCOUNTER — TELEPHONE (OUTPATIENT)
Dept: PULMONOLOGY | Facility: CLINIC | Age: 54
End: 2019-05-17

## 2019-05-17 DIAGNOSIS — G47.33 OSA (OBSTRUCTIVE SLEEP APNEA): Primary | ICD-10-CM

## 2019-05-17 NOTE — TELEPHONE ENCOUNTER
Called patient to discuss that home sleep study was positive for obstructive sleep apnea with suggested PAP therapy use.   Recommended to use the machine for at least 4 hours every night.  Will start on Autopap with pressure limits of 5-15 cm.

## 2019-06-03 ENCOUNTER — OFFICE VISIT (OUTPATIENT)
Dept: CARDIOLOGY | Facility: CLINIC | Age: 54
End: 2019-06-03

## 2019-06-03 VITALS
OXYGEN SATURATION: 99 % | DIASTOLIC BLOOD PRESSURE: 69 MMHG | HEART RATE: 78 BPM | SYSTOLIC BLOOD PRESSURE: 113 MMHG | WEIGHT: 211 LBS | BODY MASS INDEX: 31.25 KG/M2 | HEIGHT: 69 IN

## 2019-06-03 DIAGNOSIS — R07.2 PRECORDIAL PAIN: ICD-10-CM

## 2019-06-03 DIAGNOSIS — J43.2 CENTRILOBULAR EMPHYSEMA (HCC): ICD-10-CM

## 2019-06-03 DIAGNOSIS — I10 ESSENTIAL HYPERTENSION: Primary | ICD-10-CM

## 2019-06-03 DIAGNOSIS — R94.39 ABNORMAL STRESS TEST: ICD-10-CM

## 2019-06-03 DIAGNOSIS — I31.39 PERICARDIAL EFFUSION: ICD-10-CM

## 2019-06-03 DIAGNOSIS — E11.9 TYPE 2 DIABETES MELLITUS WITHOUT COMPLICATION, WITHOUT LONG-TERM CURRENT USE OF INSULIN (HCC): ICD-10-CM

## 2019-06-03 DIAGNOSIS — E78.5 DYSLIPIDEMIA: ICD-10-CM

## 2019-06-03 PROCEDURE — 99214 OFFICE O/P EST MOD 30 MIN: CPT | Performed by: NURSE PRACTITIONER

## 2019-06-03 RX ORDER — ATORVASTATIN CALCIUM 40 MG/1
40 TABLET, FILM COATED ORAL DAILY
Qty: 30 TABLET | Refills: 5 | Status: SHIPPED | OUTPATIENT
Start: 2019-06-03

## 2019-06-03 RX ORDER — ISOSORBIDE MONONITRATE 60 MG/1
60 TABLET, EXTENDED RELEASE ORAL EVERY MORNING
Qty: 30 TABLET | Refills: 5 | Status: SHIPPED | OUTPATIENT
Start: 2019-06-03

## 2019-06-03 NOTE — PROGRESS NOTES
Nelly Clay PA  Vivian Collette  1965  06/03/2019    Patient Active Problem List   Diagnosis   • Shortness of breath   • Other fatigue   • ESTEVAN (obstructive sleep apnea)   • Centrilobular emphysema (CMS/HCC)   • Precordial pain   • Essential hypertension   • Type 2 diabetes mellitus without complication, without long-term current use of insulin (CMS/HCC)   • Dyslipidemia   • Class 2 obesity in adult   • Pericardial effusion (small circumferential with no tamponade) noted on recent echo Doppler study on 1/29/2019.   • Chest pain   • Chest tightness   • Abnormal stress test       Dear Nelly Clay PA:    Subjective     Chief Complaint   Patient presents with   • Chest Pain     follow up; echo result review.   • Med Management     bottles           History of Present Illness:    Vivian Collette is a 54 y.o. female with a past medical history significant for hypertension, diabetes mellitus type 2, dyslipidemia, and abnormal stress test.  Nuclear stress test on 4/11/2019 revealed a small size, mild degree of anterior wall ischemia.  An echocardiogram revealed normal EF with grade 1 diastolic dysfunction and a small (less than 1 cm) pericardial effusion.  After further discussion, she chose to pursue medical management for her chest pains and abnormal stress test.  An echocardiogram was also ordered to reevaluate pericardial effusion.  Echocardiogram on 5/1/2019 revealed a trivial pericardial effusion which had improved since last study.  She had been placed on Imdur 30 mg daily for her chest pains.  She reports she still occasionally has a pinching pain in her left chest wall which does not seem to be related to exertion.  She cannot identify any aggravating factors.  She reports Imdur has helped significantly and almost resolved those pains.        No Known Allergies:      Current Outpatient Medications:   •  CHARY LOW DOSE 81 MG EC tablet, Take 81 mg by mouth Daily., Disp: , Rfl:   •  busPIRone  (BUSPAR) 10 MG tablet, Take 10 mg by mouth 2 (Two) Times a Day., Disp: , Rfl:   •  diclofenac (VOLTAREN) 50 MG EC tablet, Take 50 mg by mouth 2 (Two) Times a Day., Disp: , Rfl:   •  fluticasone (FLONASE) 50 MCG/ACT nasal spray, 2 sprays into the nostril(s) as directed by provider Daily., Disp: , Rfl:   •  Fluticasone Furoate 100 MCG/ACT aerosol powder , Inhale 1 puff Daily., Disp: 30 each, Rfl: 8  •  glyBURIDE (DIAbeta) 5 MG tablet, Take 5 mg by mouth Daily As Needed. Prior to [facility] Admission, Patient was on: only takes if glucose over 200, Disp: , Rfl:   •  ipratropium (ATROVENT HFA) 17 MCG/ACT inhaler, Inhale 2 puffs 4 (Four) Times a Day., Disp: 12.9 g, Rfl: 8  •  ipratropium (ATROVENT) 0.02 % nebulizer solution, Take 2.5 mL by nebulization 4 (Four) Times a Day As Needed for Wheezing or Shortness of Air., Disp: 12.5 mL, Rfl: 6  •  isosorbide mononitrate (IMDUR) 60 MG 24 hr tablet, Take 1 tablet by mouth Every Morning., Disp: 30 tablet, Rfl: 5  •  Loratadine 10 MG capsule, Take  by mouth., Disp: , Rfl:   •  metFORMIN ER (GLUCOPHAGE-XR) 500 MG 24 hr tablet, Take 1,000 mg by mouth 2 (Two) Times a Day., Disp: , Rfl:   •  metoprolol tartrate (LOPRESSOR) 25 MG tablet, Take 1 tablet by mouth 2 (Two) Times a Day., Disp: 60 tablet, Rfl: 3  •  montelukast (SINGULAIR) 10 MG tablet, Take 10 mg by mouth Every Night., Disp: , Rfl:   •  nitroglycerin (NITROSTAT) 0.4 MG SL tablet, Place 1 tablet under the tongue Every 5 (Five) Minutes As Needed for Chest Pain for up to 3 doses., Disp: 25 tablet, Rfl: 1  •  atorvastatin (LIPITOR) 40 MG tablet, Take 1 tablet by mouth Daily., Disp: 30 tablet, Rfl: 5  •  FLUoxetine (PROzac) 40 MG capsule, Take 40 mg by mouth Every Night., Disp: , Rfl:       The following portions of the patient's history were reviewed and updated as appropriate: allergies, current medications, past family history, past medical history, past social history, past surgical history and problem list.    Social History  "    Tobacco Use   • Smoking status: Former Smoker     Packs/day: 2.00     Types: Cigarettes     Last attempt to quit: 2012     Years since quittin.4   • Smokeless tobacco: Never Used   Substance Use Topics   • Alcohol use: No     Frequency: Never   • Drug use: No       Review of Systems   Constitution: Negative for chills and fever.   HENT: Negative for nosebleeds and sore throat.    Cardiovascular: Positive for chest pain. Negative for claudication, dyspnea on exertion, irregular heartbeat, leg swelling, near-syncope, orthopnea, palpitations and syncope.   Respiratory: Positive for shortness of breath. Negative for cough, hemoptysis and wheezing.    Gastrointestinal: Negative for abdominal pain, hematemesis, hematochezia, melena, nausea and vomiting.   Genitourinary: Negative for dysuria and hematuria.   Neurological: Negative for dizziness and headaches.       Objective   Vitals:    19 1338   BP: 113/69   BP Location: Right arm   Patient Position: Sitting   Cuff Size: Adult   Pulse: 78   SpO2: 99%   Weight: 95.7 kg (211 lb)   Height: 175.3 cm (69\")     Body mass index is 31.16 kg/m².        Physical Exam   Constitutional: She is oriented to person, place, and time. She appears well-developed and well-nourished.   HENT:   Head: Normocephalic and atraumatic.   Cardiovascular: Normal rate, regular rhythm and normal heart sounds. Exam reveals no S3 and no S4.   No murmur heard.  Pulmonary/Chest: Effort normal and breath sounds normal. She has no wheezes. She has no rales.   Wearing portable oxygen   Abdominal: Soft. Bowel sounds are normal.   Musculoskeletal: She exhibits no edema.   Neurological: She is alert and oriented to person, place, and time.   Skin: Skin is warm and dry.   Psychiatric: She has a normal mood and affect. Her behavior is normal.       Lab Results   Component Value Date     (L) 2019    K 4.0 2019    CL 94 (L) 2019    CO2 22.6 2019    BUN 12 2019    " CREATININE 0.80 03/28/2019    GLUCOSE 319 (H) 03/28/2019    CALCIUM 9.1 03/28/2019    AST 22 03/28/2019    ALT 28 03/28/2019    ALKPHOS 74 03/28/2019      Lab Results   Component Value Date    WBC 10.52 03/28/2019    HGB 13.1 03/28/2019    HCT 39.6 03/28/2019     03/28/2019      Lab Results   Component Value Date    MG 1.7 03/28/2019     Lab Results   Component Value Date    TSH 1.250 01/21/2019             Procedures      Assessment/Plan    Diagnosis Plan   1. Essential hypertension     2. Pericardial effusion (small circumferential with no tamponade) noted on recent echo Doppler study on 1/29/2019.     3. Abnormal stress test  isosorbide mononitrate (IMDUR) 60 MG 24 hr tablet    atorvastatin (LIPITOR) 40 MG tablet    Lipid Panel   4. Centrilobular emphysema (CMS/HCC)     5. Type 2 diabetes mellitus without complication, without long-term current use of insulin (CMS/HCC)     6. Precordial pain  isosorbide mononitrate (IMDUR) 60 MG 24 hr tablet   7. Dyslipidemia  atorvastatin (LIPITOR) 40 MG tablet    Comprehensive Metabolic Panel    Lipid Panel                Recommendations:    1.  Since the Imdur has helped with her chest pains, will increase to Imdur 60 mg daily.    2.  Discontinue lovastatin and start atorvastatin 40 mg daily    3.  CMP and lipid panel ordered.    4.  We have again discussed further evaluation with cardiac catheterization.  However, since her chest pains are improving she wishes to continue medical management for now she does not want to pursue a cardiac catheterization.    5.  Follow-up in 4 weeks with Dr. Bruno and if needed.        Return in about 4 weeks (around 7/1/2019) for Recheck.    As always, I appreciate very much the opportunity to participate in the cardiovascular care of your patients.      With Best Regards,    NAEEM Kern

## 2019-06-04 ENCOUNTER — LAB (OUTPATIENT)
Dept: LAB | Facility: HOSPITAL | Age: 54
End: 2019-06-04

## 2019-06-04 DIAGNOSIS — R94.39 ABNORMAL STRESS TEST: ICD-10-CM

## 2019-06-04 DIAGNOSIS — E78.5 DYSLIPIDEMIA: ICD-10-CM

## 2019-06-04 LAB
ALBUMIN SERPL-MCNC: 4.3 G/DL (ref 3.5–5.2)
ALBUMIN/GLOB SERPL: 1.4 G/DL
ALP SERPL-CCNC: 73 U/L (ref 39–117)
ALT SERPL W P-5'-P-CCNC: 27 U/L (ref 1–33)
ANION GAP SERPL CALCULATED.3IONS-SCNC: 14.6 MMOL/L
AST SERPL-CCNC: 19 U/L (ref 1–32)
BILIRUB SERPL-MCNC: 0.3 MG/DL (ref 0.2–1.2)
BUN BLD-MCNC: 13 MG/DL (ref 6–20)
BUN/CREAT SERPL: 13.8 (ref 7–25)
CALCIUM SPEC-SCNC: 9.7 MG/DL (ref 8.6–10.5)
CHLORIDE SERPL-SCNC: 101 MMOL/L (ref 98–107)
CHOLEST SERPL-MCNC: 209 MG/DL (ref 0–200)
CO2 SERPL-SCNC: 23.4 MMOL/L (ref 22–29)
CREAT BLD-MCNC: 0.94 MG/DL (ref 0.57–1)
GFR SERPL CREATININE-BSD FRML MDRD: 62 ML/MIN/1.73
GLOBULIN UR ELPH-MCNC: 3.1 GM/DL
GLUCOSE BLD-MCNC: 162 MG/DL (ref 65–99)
HDLC SERPL-MCNC: 32 MG/DL (ref 40–60)
LDLC SERPL CALC-MCNC: 107 MG/DL (ref 0–100)
LDLC/HDLC SERPL: 3.35 {RATIO}
POTASSIUM BLD-SCNC: 4.4 MMOL/L (ref 3.5–5.2)
PROT SERPL-MCNC: 7.4 G/DL (ref 6–8.5)
SODIUM BLD-SCNC: 139 MMOL/L (ref 136–145)
TRIGL SERPL-MCNC: 349 MG/DL (ref 0–150)
VLDLC SERPL-MCNC: 69.8 MG/DL (ref 5–40)

## 2019-06-04 PROCEDURE — 80061 LIPID PANEL: CPT

## 2019-06-04 PROCEDURE — 80053 COMPREHEN METABOLIC PANEL: CPT

## 2019-06-04 PROCEDURE — 36415 COLL VENOUS BLD VENIPUNCTURE: CPT

## 2019-06-05 ENCOUNTER — TELEPHONE (OUTPATIENT)
Dept: CARDIOLOGY | Facility: CLINIC | Age: 54
End: 2019-06-05

## 2019-06-05 NOTE — TELEPHONE ENCOUNTER
----- Message from NAEEM Kenr sent at 6/5/2019  9:01 AM EDT -----  CMp is normal with the exception of glucose which is 162. Make sure to follow with PCP for Diabetes management. Her cholesterol is above goal. Continue atorvastatin.

## 2019-07-02 ENCOUNTER — OFFICE VISIT (OUTPATIENT)
Dept: CARDIOLOGY | Facility: CLINIC | Age: 54
End: 2019-07-02

## 2019-07-02 VITALS
BODY MASS INDEX: 31.1 KG/M2 | SYSTOLIC BLOOD PRESSURE: 106 MMHG | WEIGHT: 210 LBS | DIASTOLIC BLOOD PRESSURE: 73 MMHG | HEART RATE: 74 BPM | HEIGHT: 69 IN | OXYGEN SATURATION: 98 %

## 2019-07-02 DIAGNOSIS — R07.2 PRECORDIAL PAIN: ICD-10-CM

## 2019-07-02 DIAGNOSIS — E78.5 DYSLIPIDEMIA: ICD-10-CM

## 2019-07-02 DIAGNOSIS — I10 ESSENTIAL HYPERTENSION: Primary | ICD-10-CM

## 2019-07-02 DIAGNOSIS — E11.9 TYPE 2 DIABETES MELLITUS WITHOUT COMPLICATION, WITHOUT LONG-TERM CURRENT USE OF INSULIN (HCC): ICD-10-CM

## 2019-07-02 DIAGNOSIS — R94.39 ABNORMAL NUCLEAR STRESS TEST: ICD-10-CM

## 2019-07-02 DIAGNOSIS — J43.2 CENTRILOBULAR EMPHYSEMA (HCC): ICD-10-CM

## 2019-07-02 PROCEDURE — 99214 OFFICE O/P EST MOD 30 MIN: CPT | Performed by: NURSE PRACTITIONER

## 2019-07-02 RX ORDER — RANOLAZINE 500 MG/1
500 TABLET, EXTENDED RELEASE ORAL 2 TIMES DAILY
Qty: 60 TABLET | Refills: 5 | Status: SHIPPED | OUTPATIENT
Start: 2019-07-02

## 2019-07-02 NOTE — PROGRESS NOTES
Nelly Clay PA  Vivian Collette  1965  07/02/2019    Patient Active Problem List   Diagnosis   • Shortness of breath   • Other fatigue   • ESTEVAN (obstructive sleep apnea)   • Centrilobular emphysema (CMS/HCC)   • Precordial pain   • Essential hypertension   • Type 2 diabetes mellitus without complication, without long-term current use of insulin (CMS/HCC)   • Dyslipidemia   • Class 2 obesity in adult   • Pericardial effusion (small circumferential with no tamponade) noted on recent echo Doppler study on 1/29/2019.   • Chest pain   • Chest tightness   • Abnormal nuclear stress test       Dear Nelly Clay PA:    Subjective     Chief Complaint   Patient presents with   • precordial pain     follow up   • Hypertension           History of Present Illness:    Vivian Collette is a 54 y.o. female with a history of hypertension, diabetes mellitus type 2, and dyslipidemia.  Previously, she was complaining of chest pains and underwent further evaluation with nuclear stress test.  This revealed a small size, mild degree of anterior wall ischemia in April 2019.  She was placed on low-dose aspirin, statin therapy, and Imdur daily for her chest pains.  At follow-up in June 2019, her chest pains had improved but she continued to have a pinching pain and under the left breast.  Imdur was increased to 60 mg daily.  She presents today for routine follow-up.  Reports overall, she is doing much better.  Her chest pains have significantly improved.  However, she does have frequent, intermittent sharp pains under the left breast.  She cannot identify any aggravating factors.  These usually last a few seconds and resolve spontaneously.  She does have chronic dyspnea with mild to moderate exertion and wears oxygen 24 hours a day due to severe emphysema.  She is a previous smoker but quit smoking about 12 years ago.  She is a diabetic.  She also has a history of dyslipidemia and hypertension.  Both of her parents have a  history of coronary artery disease.          No Known Allergies:      Current Outpatient Medications:   •  atorvastatin (LIPITOR) 40 MG tablet, Take 1 tablet by mouth Daily., Disp: 30 tablet, Rfl: 5  •  CHARY LOW DOSE 81 MG EC tablet, Take 81 mg by mouth Daily., Disp: , Rfl:   •  busPIRone (BUSPAR) 10 MG tablet, Take 10 mg by mouth 2 (Two) Times a Day., Disp: , Rfl:   •  diclofenac (VOLTAREN) 50 MG EC tablet, Take 50 mg by mouth 2 (Two) Times a Day., Disp: , Rfl:   •  FLUoxetine (PROzac) 40 MG capsule, Take 40 mg by mouth Every Night., Disp: , Rfl:   •  fluticasone (FLONASE) 50 MCG/ACT nasal spray, 2 sprays into the nostril(s) as directed by provider Daily., Disp: , Rfl:   •  Fluticasone Furoate 100 MCG/ACT aerosol powder , Inhale 1 puff Daily., Disp: 30 each, Rfl: 8  •  glyBURIDE (DIAbeta) 5 MG tablet, Take 5 mg by mouth Daily As Needed. Prior to [facility] Admission, Patient was on: only takes if glucose over 200, Disp: , Rfl:   •  ipratropium (ATROVENT HFA) 17 MCG/ACT inhaler, Inhale 2 puffs 4 (Four) Times a Day., Disp: 12.9 g, Rfl: 8  •  ipratropium (ATROVENT) 0.02 % nebulizer solution, Take 2.5 mL by nebulization 4 (Four) Times a Day As Needed for Wheezing or Shortness of Air., Disp: 12.5 mL, Rfl: 6  •  isosorbide mononitrate (IMDUR) 60 MG 24 hr tablet, Take 1 tablet by mouth Every Morning., Disp: 30 tablet, Rfl: 5  •  Loratadine 10 MG capsule, Take  by mouth., Disp: , Rfl:   •  metFORMIN ER (GLUCOPHAGE-XR) 500 MG 24 hr tablet, Take 1,000 mg by mouth 2 (Two) Times a Day., Disp: , Rfl:   •  metoprolol tartrate (LOPRESSOR) 25 MG tablet, Take 1 tablet by mouth 2 (Two) Times a Day., Disp: 60 tablet, Rfl: 3  •  montelukast (SINGULAIR) 10 MG tablet, Take 10 mg by mouth Every Night., Disp: , Rfl:   •  nitroglycerin (NITROSTAT) 0.4 MG SL tablet, Place 1 tablet under the tongue Every 5 (Five) Minutes As Needed for Chest Pain for up to 3 doses., Disp: 25 tablet, Rfl: 1  •  ranolazine (RANEXA) 500 MG 12 hr tablet, Take 1  "tablet by mouth 2 (Two) Times a Day., Disp: 60 tablet, Rfl: 5      The following portions of the patient's history were reviewed and updated as appropriate: allergies, current medications, past family history, past medical history, past social history, past surgical history and problem list.    Social History     Tobacco Use   • Smoking status: Former Smoker     Packs/day: 2.00     Types: Cigarettes     Last attempt to quit: 2012     Years since quittin.5   • Smokeless tobacco: Never Used   Substance Use Topics   • Alcohol use: No     Frequency: Never   • Drug use: No       Review of Systems   Constitution: Negative for weakness and malaise/fatigue.   Cardiovascular: Positive for chest pain, leg swelling, near-syncope and palpitations. Negative for dyspnea on exertion, irregular heartbeat, orthopnea, paroxysmal nocturnal dyspnea and syncope.   Respiratory: Positive for shortness of breath. Negative for cough and wheezing.    Neurological: Positive for dizziness. Negative for light-headedness.       Objective   Vitals:    19 1523   BP: 106/73   BP Location: Right arm   Pulse: 74   SpO2: 98%   Weight: 95.3 kg (210 lb)   Height: 175.3 cm (69.02\")     Body mass index is 31 kg/m².        Physical Exam   Constitutional: She is oriented to person, place, and time. She appears well-developed and well-nourished.   HENT:   Head: Normocephalic and atraumatic.   Cardiovascular: Normal rate, regular rhythm and normal heart sounds. Exam reveals no S3 and no S4.   No murmur heard.  Pulmonary/Chest: Effort normal. She has no wheezes. She has no rales.   Diminished breath sounds throughout  Wearing portable oxygen via nasal cannula   Abdominal: Soft. Bowel sounds are normal.   Musculoskeletal: She exhibits no edema.   Neurological: She is alert and oriented to person, place, and time.   Skin: Skin is warm and dry.   Psychiatric: She has a normal mood and affect. Her behavior is normal.       Lab Results   Component Value " Date     06/04/2019    K 4.4 06/04/2019     06/04/2019    CO2 23.4 06/04/2019    BUN 13 06/04/2019    CREATININE 0.94 06/04/2019    GLUCOSE 162 (H) 06/04/2019    CALCIUM 9.7 06/04/2019    AST 19 06/04/2019    ALT 27 06/04/2019    ALKPHOS 73 06/04/2019       Lab Results   Component Value Date    WBC 10.52 03/28/2019    HGB 13.1 03/28/2019    HCT 39.6 03/28/2019     03/28/2019       Lab Results   Component Value Date    MG 1.7 03/28/2019     Lab Results   Component Value Date    TSH 1.250 01/21/2019    TRIG 349 (H) 06/04/2019    HDL 32 (L) 06/04/2019     (H) 06/04/2019          Procedures      Assessment/Plan    Diagnosis Plan   1. Essential hypertension  CT Angiogram Coronary   2. Centrilobular emphysema (CMS/HCC)     3. Type 2 diabetes mellitus without complication, without long-term current use of insulin (CMS/HCC)  CT Angiogram Coronary   4. Dyslipidemia  CT Angiogram Coronary   5. Precordial pain  ranolazine (RANEXA) 500 MG 12 hr tablet    CT Angiogram Coronary   6. Abnormal nuclear stress test  CT Angiogram Coronary                Recommendations:    1. Since she has multiple risk factors for CAD with persistent chest pains, and mildly abnormal nuclear stress test, I have ordered a CT coronary angiogram to further evaluate her symptoms.    2. Will add Ranexa 500 mg BID for her chest pains.    3. Continue low dose aspirin, metoprolol, atorvastatin, and Imdur.    4. Follow up in 4 weeks and PRN.         Return in about 4 weeks (around 7/30/2019) for Recheck.    As always, I appreciate very much the opportunity to participate in the cardiovascular care of your patients.      With Best Regards,    NAEEM Kern

## 2019-11-11 NOTE — TELEPHONE ENCOUNTER
Called pt and advised her that she will need to be seen and keep the apt for further refills she expressed understanding.

## 2019-12-01 DIAGNOSIS — R94.39 ABNORMAL STRESS TEST: ICD-10-CM

## 2019-12-01 DIAGNOSIS — E78.5 DYSLIPIDEMIA: ICD-10-CM

## 2019-12-02 RX ORDER — ATORVASTATIN CALCIUM 40 MG/1
TABLET, FILM COATED ORAL
Qty: 30 TABLET | Refills: 5 | OUTPATIENT
Start: 2019-12-02

## 2019-12-16 DIAGNOSIS — E78.5 DYSLIPIDEMIA: ICD-10-CM

## 2019-12-16 DIAGNOSIS — R94.39 ABNORMAL STRESS TEST: ICD-10-CM

## 2019-12-16 RX ORDER — ATORVASTATIN CALCIUM 40 MG/1
TABLET, FILM COATED ORAL
Qty: 30 TABLET | Refills: 0 | OUTPATIENT
Start: 2019-12-16

## 2019-12-18 DIAGNOSIS — R94.39 ABNORMAL STRESS TEST: ICD-10-CM

## 2019-12-18 DIAGNOSIS — E78.5 DYSLIPIDEMIA: ICD-10-CM

## 2019-12-18 RX ORDER — ATORVASTATIN CALCIUM 40 MG/1
TABLET, FILM COATED ORAL
Qty: 30 TABLET | Refills: 0 | OUTPATIENT
Start: 2019-12-18

## 2019-12-30 RX ORDER — FLUTICASONE FUROATE 100 UG/1
POWDER RESPIRATORY (INHALATION)
Qty: 30 EACH | Refills: 0 | Status: SHIPPED | OUTPATIENT
Start: 2019-12-30 | End: 2020-01-31

## 2020-01-31 RX ORDER — FLUTICASONE FUROATE 100 UG/1
POWDER RESPIRATORY (INHALATION)
Qty: 30 EACH | Refills: 0 | Status: SHIPPED | OUTPATIENT
Start: 2020-01-31 | End: 2020-03-30

## 2020-03-30 RX ORDER — FLUTICASONE FUROATE 100 UG/1
POWDER RESPIRATORY (INHALATION)
Qty: 30 EACH | Refills: 0 | Status: SHIPPED | OUTPATIENT
Start: 2020-03-30 | End: 2020-04-22 | Stop reason: SDUPTHER

## 2020-04-22 ENCOUNTER — OFFICE VISIT (OUTPATIENT)
Dept: PULMONOLOGY | Facility: CLINIC | Age: 55
End: 2020-04-22

## 2020-04-22 DIAGNOSIS — G47.33 OSA (OBSTRUCTIVE SLEEP APNEA): Primary | ICD-10-CM

## 2020-04-22 DIAGNOSIS — J96.11 CHRONIC RESPIRATORY FAILURE WITH HYPOXIA (HCC): ICD-10-CM

## 2020-04-22 DIAGNOSIS — R91.1 PULMONARY NODULE: ICD-10-CM

## 2020-04-22 DIAGNOSIS — J43.2 CENTRILOBULAR EMPHYSEMA (HCC): ICD-10-CM

## 2020-04-22 DIAGNOSIS — Z87.891 FORMER SMOKER: ICD-10-CM

## 2020-04-22 PROCEDURE — 99442 PR PHYS/QHP TELEPHONE EVALUATION 11-20 MIN: CPT | Performed by: PHYSICIAN ASSISTANT

## 2020-04-22 RX ORDER — ALBUTEROL SULFATE 2.5 MG/3ML
2.5 SOLUTION RESPIRATORY (INHALATION) EVERY 4 HOURS PRN
Qty: 360 ML | Refills: 8 | Status: SHIPPED | OUTPATIENT
Start: 2020-04-22

## 2020-04-22 RX ORDER — ALBUTEROL SULFATE 90 UG/1
2 AEROSOL, METERED RESPIRATORY (INHALATION) EVERY 4 HOURS PRN
Qty: 1 INHALER | Refills: 8 | Status: SHIPPED | OUTPATIENT
Start: 2020-04-22

## 2020-04-22 NOTE — PROGRESS NOTES
You have chosen to receive care through a telephone visit. Do you consent to use a telephone visit for your medical care today? Yes      Interval history since last visit:    Recent hospitalizations:    Investigations (imaging, PFT's, labs, sleep study, record requests, etc.)    Have you had the Influenza Vaccine? yes      Have you had the Pneumonia Vaccine?  no      Subjective    Vivian Collette presents for the following Shortness of Breath; Apnea; Snoring; and COPD      History of Present Illness     Encounter was completed today via telephone due to COVID-19.  Follow-up is completed for centrilobular emphysema, chronic hypoxic respiratory failure, obstructive sleep apnea, pulmonary nodule, former smoking history.  Since being started on AutoPap machine, she reports significant improvement in fatigue.  She attempts to maintain nightly use of greater than 4 hours/min and reports that she has been able to use this during  the duration of sleep.  She otherwise continues with use of 2 L/min via nasal cannula throughout the day.  She reports that shortness of breath is overall stable at this time.  Inhaler therapy was switched at the last visit due to reports of tachycardia, chest pain.  She was previously on Symbicort but this was discontinued to tachycardia agitation from the beta agonist.  She continues to follow-up with cardiology and symptoms are improved at this time.  She reports that shortness of breath is noted with exertion but stable at this time.  She denies any recent acute illness, recent fever, recent sick contacts.  No significant phlegm production.  She is a former smoker with 20-year use of approximately 2 packs/day.  She would like to proceed with low-dose CT scan lung cancer screening.    Review of Systems   Constitutional: Negative for chills, fatigue and fever.   HENT: Negative for congestion, postnasal drip and rhinorrhea.    Respiratory: Positive for shortness of breath (Exertional, at  baseline). Negative for cough and wheezing.        Active Problems:  Problem List Items Addressed This Visit     None          Past Medical History:  Past Medical History:   Diagnosis Date   • Asthma    • COPD (chronic obstructive pulmonary disease) (CMS/HCC)    • Diabetes mellitus (CMS/HCC)    • Hypertension    • Sleep apnea        Family History:  Family History   Problem Relation Age of Onset   • Cancer Mother    • Hypertension Mother    • Asthma Mother    • Diabetes Mother    • Asthma Father    • Cancer Father    • Diabetes Father    • Hypertension Father    • Cancer Maternal Aunt    • Asthma Maternal Aunt    • Diabetes Maternal Aunt    • Hypertension Maternal Aunt    • Asthma Maternal Uncle    • Cancer Maternal Uncle    • Diabetes Maternal Uncle    • Hypertension Maternal Uncle    • Asthma Maternal Grandmother    • Cancer Maternal Grandmother    • Diabetes Maternal Grandmother    • Hypertension Maternal Grandmother    • Asthma Maternal Grandfather    • Cancer Maternal Grandfather    • Diabetes Maternal Grandfather    • Hypertension Maternal Grandfather        Social History:  Social History     Tobacco Use   • Smoking status: Former Smoker     Packs/day: 2.00     Types: Cigarettes     Last attempt to quit: 2012     Years since quittin.3   • Smokeless tobacco: Never Used   Substance Use Topics   • Alcohol use: No     Frequency: Never       Current Medications:  Current Outpatient Medications   Medication Sig Dispense Refill   • ARNUITY ELLIPTA 100 MCG/ACT aerosol powder  Inhale 1 puff by mouth once daily 30 each 0   • atorvastatin (LIPITOR) 40 MG tablet Take 1 tablet by mouth Daily. 30 tablet 5   • CHARY LOW DOSE 81 MG EC tablet Take 81 mg by mouth Daily.     • busPIRone (BUSPAR) 10 MG tablet Take 10 mg by mouth 2 (Two) Times a Day.     • diclofenac (VOLTAREN) 50 MG EC tablet Take 50 mg by mouth 2 (Two) Times a Day.     • fluticasone (FLONASE) 50 MCG/ACT nasal spray 2 sprays into the nostril(s) as directed  by provider Daily.     • glyBURIDE (DIAbeta) 5 MG tablet Take 5 mg by mouth Daily As Needed. Prior to [facility] Admission, Patient was on: only takes if glucose over 200     • isosorbide mononitrate (IMDUR) 60 MG 24 hr tablet Take 1 tablet by mouth Every Morning. 30 tablet 5   • cetirizine (zyrTEC) 10 MG tablet Take 10 mg by mouth Daily.     • FLUoxetine (PROzac) 40 MG capsule Take 40 mg by mouth Every Night.     • ipratropium (ATROVENT HFA) 17 MCG/ACT inhaler Inhale 2 puffs 4 (Four) Times a Day. 12.9 g 8   • ipratropium (ATROVENT) 0.02 % nebulizer solution INHALE 1 VIAL IN NEBULIZER EVERY 4 HOURS AS NEEDED FOR WHEEZING AND FOR SHORTNESS OF BREATH 375 mL 6   • Loratadine 10 MG capsule Take  by mouth.     • metFORMIN ER (GLUCOPHAGE-XR) 500 MG 24 hr tablet Take 1,000 mg by mouth 2 (Two) Times a Day.     • metoprolol tartrate (LOPRESSOR) 25 MG tablet TAKE 1 TABLET BY MOUTH TWICE DAILY 60 tablet 3   • montelukast (SINGULAIR) 10 MG tablet Take 10 mg by mouth Every Night.     • nitroglycerin (NITROSTAT) 0.4 MG SL tablet Place 1 tablet under the tongue Every 5 (Five) Minutes As Needed for Chest Pain for up to 3 doses. 25 tablet 1   • O2 (OXYGEN) Inhale 3 L/min As Needed.     • ranolazine (RANEXA) 500 MG 12 hr tablet Take 1 tablet by mouth 2 (Two) Times a Day. 60 tablet 5     No current facility-administered medications for this visit.        Allergies:  No Known Allergies    Vitals:  There were no vitals taken for this visit.    Imaging:    Imaging Results (Most Recent)     None          Pulmonary Functions Testing Results:    No results found for: FEV1, FVC, PCT2ZWL, TLC, DLCO    Results for orders placed or performed in visit on 06/04/19   Comprehensive Metabolic Panel   Result Value Ref Range    Glucose 162 (H) 65 - 99 mg/dL    BUN 13 6 - 20 mg/dL    Creatinine 0.94 0.57 - 1.00 mg/dL    Sodium 139 136 - 145 mmol/L    Potassium 4.4 3.5 - 5.2 mmol/L    Chloride 101 98 - 107 mmol/L    CO2 23.4 22.0 - 29.0 mmol/L    Calcium  9.7 8.6 - 10.5 mg/dL    Total Protein 7.4 6.0 - 8.5 g/dL    Albumin 4.30 3.50 - 5.20 g/dL    ALT (SGPT) 27 1 - 33 U/L    AST (SGOT) 19 1 - 32 U/L    Alkaline Phosphatase 73 39 - 117 U/L    Total Bilirubin 0.3 0.2 - 1.2 mg/dL    eGFR Non African Amer 62 >60 mL/min/1.73    Globulin 3.1 gm/dL    A/G Ratio 1.4 g/dL    BUN/Creatinine Ratio 13.8 7.0 - 25.0    Anion Gap 14.6 mmol/L   Lipid Panel   Result Value Ref Range    Total Cholesterol 209 (H) 0 - 200 mg/dL    Triglycerides 349 (H) 0 - 150 mg/dL    HDL Cholesterol 32 (L) 40 - 60 mg/dL    LDL Cholesterol  107 (H) 0 - 100 mg/dL    VLDL Cholesterol 69.8 (H) 5 - 40 mg/dL    LDL/HDL Ratio 3.35        Objective   Physical Exam     Physical exam not completed today as visit was completed via telephone encounter.     Assessment/Plan      I have reviewed the past medical history, family history, social history, surgical history, and allergies.     I have reviewed the CT chest imaging and report from April 2019.  Notable for a 4 mm noncalcified nodule the right lung.  COPD changes.    Pulmonary function test completed in February 2019.  Moderately reduced DLCO.  Significant bronchodilator response present.  No true obstruction.    Home sleep study was positive for sleep apnea on 3/27/2019.      ICD-10-CM ICD-9-CM   1. ESTEVAN (obstructive sleep apnea) G47.33 327.23   2. Centrilobular emphysema (CMS/[facility]) J43.2 492.8   3. Chronic respiratory failure with hypoxia (CMS/HCC) J96.11 518.83     799.02   4. Pulmonary nodule R91.1 793.11   5. Former smoker Z87.891 V15.82          Centrilobular emphysema:   · On Arnuity 100 mcg once daily  · On albuterol inhaler and nebulizer treatments as needed.   · Patient states that symptoms are stable at this time. LABA previously avoided due to chest pain noted with Symbicort use. She has since completed a stress test which showed mild ischemia of the anterior wall and low risk study. Continues to follow with cardiology.   Will add LAMA as needed if  symptom worsening noted.  If she remains symptomatically stable at the next visit, we may switch from inhaled steroid to LAMA.   Currently continuing with steroid inhaler therapy as PFT was also suggestive of a asthmatic bronchitis due to significant bronchodilator effect, and thus may have more benefit from steroid inhaler initial therapy.  · PFT reviewed, significant bronchodilator response noted.   Will order PFT at the next visit.   · On singulair nightly.       Chronic hypoxic respiratory failure  · On 2 L/m continuous, compliant with use.       Pulmonary nodule:   · Ordered low dose CT chest. Nodule previously measured 4 mm of the right lung, noncalcified.       Obstructive sleep apnea:   · Compliant with Autopap with additional 2 L/m.  She has noted significant improvement of fatigue with use.  Patient reports that she attempts use every night and has noticed a significant improvement in daytime fatigue with use.     Management obstructive sleep apnea also includes lifestyle modifications including weight loss, avoidance of alcohol, sedated, caffeine especially before bedtime, allowing adequate sleep time, and body position during sleep such as side versus back. 10% weight loss can result in a 50% improvement of the apnea-hypopnea index.  Untreated sleep apnea can lead to cardiovascular/metabolic disorder, neurocognitive deficit, daytime sleepiness, motor vehicle accidents, depression, mood disorders and reduced quality of life.  Patient understands the risk of untreated obstructive sleep apnea and benefit benefits of the treatment. Recommended at least 4 hours of use per night for 70% of every month (approximately 21 out of 30 days) per CMS guidelines.       Former smoker:   Mrs. Collette has a 20 year use with an average of 2 packs per day, giving her a pack year total of 40.   She reports that she quit smoking 15 years ago. She would like to complete CT imaging for follow up of a previous pulmonary nodule  and further lung cancer screening.   · Ordered low dose CT chest for June 2020, as she prefers a later date due to COVID-19 concerns.         Vaccinations: up to date on influenza vaccination. Not yet received the pneumonia vaccination.       BMI was not calculated today as this was completed via telephone encounter.   Most recently calculated BMI was 31.0.         Return in about 6 months (around 10/22/2020), or as needed.       This visit has been rescheduled as a phone visit to comply with patient safety concerns in accordance with CDC recommendations. Total time of discussion was 14 minutes.

## 2020-06-02 RX ORDER — FLUTICASONE FUROATE 100 UG/1
POWDER RESPIRATORY (INHALATION)
Qty: 30 EACH | Refills: 0 | Status: SHIPPED | OUTPATIENT
Start: 2020-06-02 | End: 2020-07-02

## 2020-06-04 ENCOUNTER — HOSPITAL ENCOUNTER (OUTPATIENT)
Dept: CT IMAGING | Facility: HOSPITAL | Age: 55
Discharge: HOME | End: 2020-06-04
Admitting: PHYSICIAN ASSISTANT

## 2020-06-04 DIAGNOSIS — R91.1 PULMONARY NODULE: ICD-10-CM

## 2020-06-04 DIAGNOSIS — J43.2 CENTRILOBULAR EMPHYSEMA (HCC): ICD-10-CM

## 2020-06-04 DIAGNOSIS — Z87.891 FORMER SMOKER: ICD-10-CM

## 2020-06-04 PROCEDURE — 71250 CT THORAX DX C-: CPT | Performed by: RADIOLOGY

## 2020-06-04 PROCEDURE — G0297 LDCT FOR LUNG CA SCREEN: HCPCS

## 2020-06-11 ENCOUNTER — TELEPHONE (OUTPATIENT)
Dept: PULMONOLOGY | Facility: CLINIC | Age: 55
End: 2020-06-11

## 2020-06-11 DIAGNOSIS — R91.8 MULTIPLE PULMONARY NODULES: ICD-10-CM

## 2020-06-11 DIAGNOSIS — R91.1 NODULE OF LEFT LUNG: Primary | ICD-10-CM

## 2020-06-11 NOTE — TELEPHONE ENCOUNTER
Called patient with CT scan results for low dose CT.     There were several small nodules not mentioned on the report, but appeared stable upon personal comparison with the previous imaging.   · Calcified 2 mm nodule on image 39 of the right posterior lung  · ground glass nodule 3 mm of right lung centrally located on image 78  · Calcified 1-2 mm nodule of the right lung, calcified and pleural based of the lateral chest    The report specifically mentioned:   · 1-2 cm pleural nodule anteriorly of the left upper lobe without calcification, on image 93.   No comparison mentioned to the previous imaging, although the area appeared larger and less defined on the CT in April 2019.   Image pasted below (2020 image of the left and 2019 image of the right).   For this finding of Lung RAD 2, she is recommended for repeat imaging in 6 months.   She was agreeable to repeat imaging in 6 months.     · Ordered CT chest without contrast ordered for 6 months for surveillance of a 1 x 2 cm left nodule with other stable nodules measuring < 6 mm.

## 2020-07-02 RX ORDER — FLUTICASONE FUROATE 100 UG/1
POWDER RESPIRATORY (INHALATION)
Qty: 30 EACH | Refills: 0 | Status: SHIPPED | OUTPATIENT
Start: 2020-07-02

## 2021-03-18 ENCOUNTER — BULK ORDERING (OUTPATIENT)
Dept: CASE MANAGEMENT | Facility: OTHER | Age: 56
End: 2021-03-18

## 2021-03-18 DIAGNOSIS — Z23 IMMUNIZATION DUE: ICD-10-CM

## 2021-08-20 ENCOUNTER — TRANSCRIBE ORDERS (OUTPATIENT)
Dept: ADMINISTRATIVE | Facility: HOSPITAL | Age: 56
End: 2021-08-20

## 2021-08-20 DIAGNOSIS — Z87.891 PERSONAL HISTORY OF NICOTINE DEPENDENCE: Primary | ICD-10-CM

## 2021-08-31 ENCOUNTER — HOSPITAL ENCOUNTER (OUTPATIENT)
Dept: CT IMAGING | Facility: HOSPITAL | Age: 56
Discharge: HOME | End: 2021-08-31
Admitting: FAMILY MEDICINE

## 2021-08-31 DIAGNOSIS — Z87.891 PERSONAL HISTORY OF NICOTINE DEPENDENCE: ICD-10-CM

## 2021-08-31 PROCEDURE — 71271 CT THORAX LUNG CANCER SCR C-: CPT | Performed by: RADIOLOGY

## 2021-08-31 PROCEDURE — 71271 CT THORAX LUNG CANCER SCR C-: CPT

## 2023-05-11 DIAGNOSIS — J45.909 ASTHMA, UNSPECIFIED ASTHMA SEVERITY, UNSPECIFIED WHETHER COMPLICATED, UNSPECIFIED WHETHER PERSISTENT: Primary | ICD-10-CM

## 2024-02-21 ENCOUNTER — HOSPITAL ENCOUNTER (EMERGENCY)
Facility: HOSPITAL | Age: 59
Discharge: HOME | End: 2024-02-21
Attending: EMERGENCY MEDICINE | Admitting: EMERGENCY MEDICINE
Payer: COMMERCIAL

## 2024-02-21 ENCOUNTER — APPOINTMENT (OUTPATIENT)
Dept: GENERAL RADIOLOGY | Facility: HOSPITAL | Age: 59
End: 2024-02-21
Payer: COMMERCIAL

## 2024-02-21 VITALS
DIASTOLIC BLOOD PRESSURE: 86 MMHG | HEIGHT: 69 IN | OXYGEN SATURATION: 97 % | WEIGHT: 218 LBS | BODY MASS INDEX: 32.29 KG/M2 | TEMPERATURE: 97.7 F | HEART RATE: 78 BPM | RESPIRATION RATE: 20 BRPM | SYSTOLIC BLOOD PRESSURE: 141 MMHG

## 2024-02-21 DIAGNOSIS — R94.31 ABNORMAL EKG: Primary | ICD-10-CM

## 2024-02-21 LAB
ALBUMIN SERPL-MCNC: 4.3 G/DL (ref 3.5–5.2)
ALBUMIN/GLOB SERPL: 1.3 G/DL
ALP SERPL-CCNC: 84 U/L (ref 39–117)
ALT SERPL W P-5'-P-CCNC: 36 U/L (ref 1–33)
ANION GAP SERPL CALCULATED.3IONS-SCNC: 16.7 MMOL/L (ref 5–15)
APTT PPP: 26.6 SECONDS (ref 26.5–34.5)
AST SERPL-CCNC: 25 U/L (ref 1–32)
BASOPHILS # BLD AUTO: 0.05 10*3/MM3 (ref 0–0.2)
BASOPHILS NFR BLD AUTO: 0.5 % (ref 0–1.5)
BILIRUB SERPL-MCNC: 0.2 MG/DL (ref 0–1.2)
BUN SERPL-MCNC: 12 MG/DL (ref 6–20)
BUN/CREAT SERPL: 16.2 (ref 7–25)
CALCIUM SPEC-SCNC: 9.9 MG/DL (ref 8.6–10.5)
CHLORIDE SERPL-SCNC: 95 MMOL/L (ref 98–107)
CO2 SERPL-SCNC: 22.3 MMOL/L (ref 22–29)
CREAT SERPL-MCNC: 0.74 MG/DL (ref 0.57–1)
DEPRECATED RDW RBC AUTO: 40.7 FL (ref 37–54)
EGFRCR SERPLBLD CKD-EPI 2021: 93.3 ML/MIN/1.73
EOSINOPHIL # BLD AUTO: 0.62 10*3/MM3 (ref 0–0.4)
EOSINOPHIL NFR BLD AUTO: 5.7 % (ref 0.3–6.2)
ERYTHROCYTE [DISTWIDTH] IN BLOOD BY AUTOMATED COUNT: 13.2 % (ref 12.3–15.4)
GLOBULIN UR ELPH-MCNC: 3.2 GM/DL
GLUCOSE SERPL-MCNC: 286 MG/DL (ref 65–99)
HBA1C MFR BLD: 10.9 % (ref 4.8–5.6)
HCT VFR BLD AUTO: 41.6 % (ref 34–46.6)
HGB BLD-MCNC: 14.3 G/DL (ref 12–15.9)
IMM GRANULOCYTES # BLD AUTO: 0.01 10*3/MM3 (ref 0–0.05)
IMM GRANULOCYTES NFR BLD AUTO: 0.1 % (ref 0–0.5)
INR PPP: 0.89 (ref 0.9–1.1)
LYMPHOCYTES # BLD AUTO: 5.17 10*3/MM3 (ref 0.7–3.1)
LYMPHOCYTES NFR BLD AUTO: 47.8 % (ref 19.6–45.3)
MCH RBC QN AUTO: 29.4 PG (ref 26.6–33)
MCHC RBC AUTO-ENTMCNC: 34.4 G/DL (ref 31.5–35.7)
MCV RBC AUTO: 85.4 FL (ref 79–97)
MONOCYTES # BLD AUTO: 0.51 10*3/MM3 (ref 0.1–0.9)
MONOCYTES NFR BLD AUTO: 4.7 % (ref 5–12)
NEUTROPHILS NFR BLD AUTO: 4.45 10*3/MM3 (ref 1.7–7)
NEUTROPHILS NFR BLD AUTO: 41.2 % (ref 42.7–76)
NRBC BLD AUTO-RTO: 0 /100 WBC (ref 0–0.2)
PLATELET # BLD AUTO: 303 10*3/MM3 (ref 140–450)
PMV BLD AUTO: 10.8 FL (ref 6–12)
POTASSIUM SERPL-SCNC: 4.4 MMOL/L (ref 3.5–5.2)
PROT SERPL-MCNC: 7.5 G/DL (ref 6–8.5)
PROTHROMBIN TIME: 12.5 SECONDS (ref 12.1–14.7)
RBC # BLD AUTO: 4.87 10*6/MM3 (ref 3.77–5.28)
SODIUM SERPL-SCNC: 134 MMOL/L (ref 136–145)
TROPONIN T SERPL HS-MCNC: 6 NG/L
TROPONIN T SERPL HS-MCNC: 8 NG/L
WBC NRBC COR # BLD AUTO: 10.81 10*3/MM3 (ref 3.4–10.8)

## 2024-02-21 PROCEDURE — 93010 ELECTROCARDIOGRAM REPORT: CPT | Performed by: INTERNAL MEDICINE

## 2024-02-21 PROCEDURE — 71045 X-RAY EXAM CHEST 1 VIEW: CPT

## 2024-02-21 PROCEDURE — 71045 X-RAY EXAM CHEST 1 VIEW: CPT | Performed by: RADIOLOGY

## 2024-02-21 PROCEDURE — 84484 ASSAY OF TROPONIN QUANT: CPT | Performed by: STUDENT IN AN ORGANIZED HEALTH CARE EDUCATION/TRAINING PROGRAM

## 2024-02-21 PROCEDURE — 85025 COMPLETE CBC W/AUTO DIFF WBC: CPT | Performed by: EMERGENCY MEDICINE

## 2024-02-21 PROCEDURE — 36415 COLL VENOUS BLD VENIPUNCTURE: CPT

## 2024-02-21 PROCEDURE — 83036 HEMOGLOBIN GLYCOSYLATED A1C: CPT | Performed by: EMERGENCY MEDICINE

## 2024-02-21 PROCEDURE — 85730 THROMBOPLASTIN TIME PARTIAL: CPT | Performed by: EMERGENCY MEDICINE

## 2024-02-21 PROCEDURE — 85610 PROTHROMBIN TIME: CPT | Performed by: EMERGENCY MEDICINE

## 2024-02-21 PROCEDURE — 80053 COMPREHEN METABOLIC PANEL: CPT | Performed by: EMERGENCY MEDICINE

## 2024-02-21 PROCEDURE — 84484 ASSAY OF TROPONIN QUANT: CPT | Performed by: EMERGENCY MEDICINE

## 2024-02-21 PROCEDURE — 99284 EMERGENCY DEPT VISIT MOD MDM: CPT

## 2024-02-21 PROCEDURE — 93005 ELECTROCARDIOGRAM TRACING: CPT | Performed by: STUDENT IN AN ORGANIZED HEALTH CARE EDUCATION/TRAINING PROGRAM

## 2024-02-21 NOTE — ED PROVIDER NOTES
"Subjective   History of Present Illness  HPI:   58-year-old female went to see primary care today for routine medication refill.  She is not out of her medications yet is only has a few days before they are \"out\".  Apparently patient had an EKG and a blood sugar checked there and her heart was \"in distress\", and PCP recommended patient comes here for further evaluation.  Patient currently has no complaints.  Denies chest pain palpitations or shortness of breath.  She does state for several months despite medication compliant her blood sugars have been averaging 400-500 on a daily basis.    Review of systems:  Negative fever and chills  Negative nausea vomiting  Negative orthopnea  Negative diarrhea or constipation  Negative palpitations  Negative shortness of breath or cough  Negative ill contact or travel  Negative mobility  Negative lower extremity  swelling  Negative rash  Negative medication change  Negative headaches or vision changes  Negative dysuria or hematuria  All other systems reviewed are negative      Past Medical history:  reviewed nursing notes: Diabetes induced neuropathy, diabetes hypertension hyperlipidemia congestive heart failure secondary to pneumonia denies history of coronary disease  Past Surrgical history: reviewed nursing notes:    Social History: Reviewed nursing notes:  Pertinent Family History:  Medications and allergies: reviewed nursing notes:    Physical examination:  Vital signs: Reviewed  General: Nontoxic, nondistressed, AOx 4  HEENT: Oral mucosa moist, pupils equal and reactive to light,, sclera is clear  Neck: Unremarkable, negative carotid bruits  Lungs: Nonlabored breathing, equal rise of the chest, bilateral breath sounds are clear, negative cough wheezing crackles no JVD at 40 degrees  Cardiovascular: Regular rate and rhythm, negative rubs or murmurs radial and pedal pulses are +2 and equal  Abdomen: Soft, nontender to palpation, negative guarding rebound or distention, " "positive bowel sounds,   Extremities: Negative for edema, negative calf tenderness negative Homans' sign   Skin: Negative petechiae, negative rash  Neuro: No gross motor or sensory deficit, speech is clear, movements are fluid without ataxia  Mood/affect: Unremarkable    Heart Score 4    Tests and radiological imaging interpreted by provider  EKG: Sinus rhythm at a rate of 90 with a 30 degree axis.  Left bundle branch block, yet no gross findings for Sgarbossa's criteria, EKG is changed compared to 2019  Medical Decision Making:  after reviewing applicable available laboratory and radiological data, while reviewing my HPI and Review of Systems along with my physical exam findings:    Assessment and plan:   This patient is nontoxic examination and is currently without complaint.  Patient does have lower feet and leg pain yet in review this is a chronic secondary to her diabetic neuropathy she states that this is \"it is always like that.\" Negative Homans' sign negative calf tenderness lower suspicion for DVT, no prior history of.    patient presented today to her primary care needing medication refill (she states she has a few days left).  Denies specific chest pain or palpitations.  Patient denies angina symptoms at home.  Unsure of primary care's concern patient does have left bundle branch block which appears new but symptoms and EKG not consistent with Sgarbossa's criteria.    Follow-up examination: Patient continues without complaint and is eager to return home.  Long discussion with family as well as the patient.  Discussed limitations of today's workup.  Patient does have increased cardiac risk factors yet her history and symptoms did not really correlate with ACS or even angina or unstable angina.  Discussed limitations of today's workup.  Offered to do additional laboratory workup and observation patient declined.  Patient reports uncontrolled blood sugar despite being compliant with her glyburide and " metformin, hemoglobin A1c is elevated to support this.  At this point I will go ahead and honor her request and discharged home encouraged her close follow-up with primary care for diabetes management.    Discussed with  laboratory, radiology studies, diagnosis and action plan, and follow-up.  Patient verbalized understanding and agreement    Clinical impression:  Abnormal EKG:  Diabetes type 2: Established poorly controlled    Disposition: Home  Condition: Stable     This note was performed, in part, by voice-recognition software and may contain dictated related graphical errors including word substitution errors or nonsensical phrases may exist.  These areas have been corrected to the best of my ability, however some errors may remain due to prioritization of patient care.    Under the 21st Century Cures Act, medical progress note should be visible to the patient.  It must be taken into consideration that these notes will include professional medical terminology, protocols and practices that may be somewhat confusing without interpretation from your medical team.  It is strongly advisable to review this documentation with your primary care provider.  The intent of this progress note and other documents are to communicate information between medical professionals involved in your care and to serve as future reference for physicians or other professionals when reviewing your case              Review of Systems    Past Medical History:   Diagnosis Date    Asthma     COPD (chronic obstructive pulmonary disease)     Diabetes mellitus     Hypertension     Sleep apnea        No Known Allergies    Past Surgical History:   Procedure Laterality Date    EAR RECONSTRUCTION      LAPAROSCOPIC TUBAL LIGATION      TONSILLECTOMY         Family History   Problem Relation Age of Onset    Cancer Mother     Hypertension Mother     Asthma Mother     Diabetes Mother     Asthma Father     Cancer Father     Diabetes Father      Hypertension Father     Cancer Maternal Aunt     Asthma Maternal Aunt     Diabetes Maternal Aunt     Hypertension Maternal Aunt     Asthma Maternal Uncle     Cancer Maternal Uncle     Diabetes Maternal Uncle     Hypertension Maternal Uncle     Asthma Maternal Grandmother     Cancer Maternal Grandmother     Diabetes Maternal Grandmother     Hypertension Maternal Grandmother     Asthma Maternal Grandfather     Cancer Maternal Grandfather     Diabetes Maternal Grandfather     Hypertension Maternal Grandfather        Social History     Socioeconomic History    Marital status:    Tobacco Use    Smoking status: Former     Packs/day: 2.00     Years: 20.00     Additional pack years: 0.00     Total pack years: 40.00     Types: Cigarettes     Quit date: 2005     Years since quittin.1    Smokeless tobacco: Never   Substance and Sexual Activity    Alcohol use: No    Drug use: No           Objective   Physical Exam    Procedures           ED Course                                             Medical Decision Making  Problems Addressed:  Abnormal EKG: complicated acute illness or injury    Amount and/or Complexity of Data Reviewed  Labs: ordered.  Radiology: ordered.        Final diagnoses:   None       ED Disposition  ED Disposition       None            No follow-up provider specified.       Medication List      No changes were made to your prescriptions during this visit.            Matthew Bennett MD  24

## 2024-02-21 NOTE — ED NOTES
"Patient advises she went to her PCP for her med refill, and EKG was completed and patient was told to come to the ED d/t her \"heart being in distress.\" Patient denies any chest pain, denies any SOB, denies any s/s or any complaints at this time. All safety measures in place. Call light in reach.  "

## 2024-02-22 ENCOUNTER — TRANSCRIBE ORDERS (OUTPATIENT)
Dept: ADMINISTRATIVE | Facility: HOSPITAL | Age: 59
End: 2024-02-22
Payer: COMMERCIAL

## 2024-02-22 DIAGNOSIS — Z12.31 VISIT FOR SCREENING MAMMOGRAM: Primary | ICD-10-CM

## 2024-02-22 NOTE — DISCHARGE INSTRUCTIONS
Please follow-up with your primary care physician in the next 3 to 4 days.  Today's evaluation is an isolated encounter in your overall health care, and therefore as we discussed please return to the emergency department if your symptoms change or worsen.

## 2024-02-25 LAB
QT INTERVAL: 390 MS
QTC INTERVAL: 492 MS

## 2024-04-08 DIAGNOSIS — J45.909 ASTHMA, UNSPECIFIED ASTHMA SEVERITY, UNSPECIFIED WHETHER COMPLICATED, UNSPECIFIED WHETHER PERSISTENT: ICD-10-CM

## 2025-03-01 DIAGNOSIS — J45.909 ASTHMA, UNSPECIFIED ASTHMA SEVERITY, UNSPECIFIED WHETHER COMPLICATED, UNSPECIFIED WHETHER PERSISTENT: ICD-10-CM
